# Patient Record
Sex: FEMALE | Race: WHITE | NOT HISPANIC OR LATINO | Employment: UNEMPLOYED | URBAN - METROPOLITAN AREA
[De-identification: names, ages, dates, MRNs, and addresses within clinical notes are randomized per-mention and may not be internally consistent; named-entity substitution may affect disease eponyms.]

---

## 2017-03-20 ENCOUNTER — OFFICE VISIT (OUTPATIENT)
Dept: BEHAVIORAL HEALTH | Facility: PHYSICIAN GROUP | Age: 22
End: 2017-03-20
Payer: COMMERCIAL

## 2017-03-20 DIAGNOSIS — F33.0 MILD EPISODE OF RECURRENT MAJOR DEPRESSIVE DISORDER (HCC): ICD-10-CM

## 2017-03-20 PROCEDURE — 99214 OFFICE O/P EST MOD 30 MIN: CPT | Performed by: PSYCHIATRY & NEUROLOGY

## 2017-03-20 RX ORDER — VENLAFAXINE HYDROCHLORIDE 37.5 MG/1
37.5 CAPSULE, EXTENDED RELEASE ORAL
Qty: 7 CAP | Refills: 0 | Status: SHIPPED | OUTPATIENT
Start: 2017-03-20 | End: 2021-10-08

## 2017-03-20 RX ORDER — VENLAFAXINE HYDROCHLORIDE 75 MG/1
75 TABLET, EXTENDED RELEASE ORAL DAILY
Qty: 30 TAB | Refills: 2 | Status: SHIPPED | OUTPATIENT
Start: 2017-03-20 | End: 2021-10-08

## 2017-03-20 NOTE — PROGRESS NOTES
"[facility]  PSYCHIATRIC FOLLOW-UP NOTE    Name: Rosa M Easton  MRN: [id number]  : 1995  Age: 22 y.o.  Date of assessment: 3/20/2017  PCP: Lesa Caraballo M.D.  Persons in attendance: Patient    REASON FOR VISIT/CHIEF COMPLAINT (as stated by Patient):  Rosa M Easton is a 22 y.o., White female, attending follow-up appointment for   Chief Complaint   Patient presents with   • Follow-Up     feeling more depressed.     .      HISTORY OF PRESENT ILLNESS:    Pt was being tx for MDD in the past.  Last seen in clinic on 2015 by Dr. Pritchett.  Pt had weaned herself off the meds.  Pt reports that she had been doing well for a while.  She has been living with her Dad and little sister.  Pt states she was doing well for a while and that was the driving reason she stopped the meds.  Pt was on Effexor and Abilify.  Pt also endorsed a paranoia around the night robert, but this stopped when she ceased the two agents.     In the last 3 months, pt has felt her mood begin to deteriorate, but she finally decided to come in for appt last week.  Pt denies any triggers or social changes that may have contributed to the change in mood.  Pt noticed she is sleeping more and napping more (about 9-10hr) where she typically is a 6-7hr person.  Pt also endorsed + anhedonia, low energy, ok-low concentration, increased appetite (with weight gain recent 20lbs), +sluggish feeling, DENIES any recent SI.  Pt had a SA in 3/2105 just after the parents divorce and her mother choose the \"new BF\" over the family.  Pt feels the Effexor was helpful in the past.       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  Participation: Active verbal participation  Grooming:Neat  Orientation: Alert and Fully Oriented  Eye contact: Good  Behavior:Calm   Mood: Euthymic \"nicol\"  Affect: Flat and Congruent with content  Thought process: Logical and Goal-directed  Thought content:  Within normal limits  Speech: Rate within normal limits and Volume within normal limits  Perception:  Within normal limits  Memory:  No gross evidence of memory deficits  Insight: Good  Judgment: Good  Family/couple interaction observations:   Other:    Current risk:    Suicide: Low   Homicide: Low   Self-harm: Low  Relapse: Low  Other:   Crisis Safety Plan reviewed?Yes  If evidence of imminent risk is present, intervention/plan:    Medical Records/Labs/Diagnostic Tests Reviewed: no recent labs    Medical Records/Labs/Diagnostic Tests Ordered: order routine labs    DIAGNOSTIC IMPRESSION(S):  1. Mild episode of recurrent major depressive disorder (CMS-HCC)           ASSESSMENT AND PLAN:  #MDD, rec, Mild  Pt was previously being tx for depression in 2015, but she weaned herself off meds and was lost to f/u.  For about the last 3 months, pt has felt her mood deteriorate, and she decided to come in.  Pt denies any known triggers or changes that may have contributed.  She states things have been going well with living with her father, and she and the BF are getting along.  Pt is now working as well.  Pt screened positive for depression criteria.  Pt feels she did well on the Effexor in the past, so will resume.    Restart Effexor XR, taper up to 75mg PO Daily.  Routine labs.  Discussed increase exercise and diet changes    RTC 4wks      More than 50% of face-to-face time in this 30 minute visit was not spent in " psychotherapy/counseling.    Topics addressed include:    Hamlet Charles M.D.

## 2017-03-20 NOTE — MR AVS SNAPSHOT
Rosa M Easton   3/20/2017 1:00 PM   Office Visit   MRN: [id number]    Department:  [facility]   Dept Phone:  [phone]    Description:  Female : 1995   Provider:  Hamlet Charles M.D.           Reason for Visit     Follow-Up feeling more depressed.        Allergies as of 3/20/2017     No Known Allergies      You were diagnosed with     Mild episode of recurrent major depressive disorder (CMS-[facility])   [[id number]]         Vital Signs     Smoking Status                   Never Smoker            Basic Information     Date Of Birth Sex Race Ethnicity Preferred Language    1995 Female White Non- English      Problem List              ICD-10-CM Priority Class Noted - Resolved    Overdose T50.901A   3/27/2015 - Present      Health Maintenance        Date Due Completion Dates    IMM HEP B VACCINE (1 of 3 - Primary Series) 1997 ---    IMM HEP A VACCINE (1 of 2 - Standard Series) 1998 ---    IMM HPV VACCINE (1 of 3 - Female 3 Dose Series) 2008 ---    IMM VARICELLA (CHICKENPOX) VACCINE (1 of 2 - 2 Dose Adolescent Series) 2010 ---    IMM MENINGOCOCCAL VACCINE (MCV4) (1 of 1) 2013 ---    IMM DTaP/Tdap/Td Vaccine (1 - Tdap) 2016 ---    IMM INFLUENZA (1) 2016 ---            Current Immunizations     No immunizations on file.      Below and/or attached are the medications your provider expects you to take. Review all of your home medications and newly ordered medications with your provider and/or pharmacist. Follow medication instructions as directed by your provider and/or pharmacist. Please keep your medication list with you and share with your provider. Update the information when medications are discontinued, doses are changed, or new medications (including over-the-counter products) are added; and carry medication information at all times in the event of emergency situations     Allergies:  No Known Allergies          Medications  Valid as of: 2017 -   1:54 PM    Generic Name Brand Name Tablet Size Instructions for use    Acetaminophen-Codeine (Tab) Acetaminophen-Codeine 300-30 MG Take 2 Tabs by mouth every four hours as needed.        Albuterol Sulfate (Aero Soln) albuterol 108 (90 BASE) MCG/ACT Inhale 2 Puffs by mouth every 6 hours as needed for Shortness of Breath.        Amoxicillin-Pot Clavulanate (Tab) AUGMENTIN 875-125 MG Take 1 Tab by mouth 2 times a day.        ARIPiprazole   Take  by mouth.        Azithromycin (Tab) ZITHROMAX 250 MG Use HOLLY as directed        Cetirizine HCl (Tab) ZYRTEC 10 MG Take 10 mg by mouth every day.        DiphenhydrAMINE HCl   Take  by mouth.        Fexofenadine HCl (Tab) ALLEGRA 60 MG Take 60 mg by mouth every day.        Fluconazole (Tab) DIFLUCAN 150 MG 1 tablet on day one, then 1 tablet PO on day 3 if no improvment        MethylPREDNISolone (Tab) MEDROL DOSPACK 4 MG U.D.        PredniSONE (Tab) DELTASONE 20 MG 2 tablets PO x 3 days.  Take with food in the morning.        Pseudoephedrine-APAP-DM   Take  by mouth.        Saline (Solution) OCEAN 0.65 % Spray 1 Spray in nose as needed for Congestion.        Venlafaxine HCl (CAPSULE SR 24 HR) EFFEXOR XR 37.5 MG Take 1 Cap by mouth every morning with breakfast.        Venlafaxine HCl (TABLET SR 24 HR) Venlafaxine HCl 75 MG Take 1 Tab by mouth every day.        .                 Medicines prescribed today were sent to:     [facility] [address] [place] [address] [address] [facility] [address]    Phone: [phone] Fax: [phone]    Open 24 Hours?: Yes      Medication refill instructions:       If your prescription bottle indicates you have medication refills left, it is not necessary to call your provider’s office. Please contact your pharmacy and they will refill your medication.    If your prescription bottle indicates you do not have any refills left, you may request refills at any time through one of the following ways:  The online [employer] system (except Urgent Care), by calling your provider’s office, or by asking your pharmacy to contact your provider’s office with a refill request. Medication refills are processed only during regular business hours and may not be available until the next business day. Your provider may request additional information or to have a follow-up visit with you prior to refilling your medication.   *Please Note: Medication refills are assigned a new Rx number when refilled electronically. Your pharmacy may indicate that no refills were authorized even though a new prescription for the same medication is available at the pharmacy. Please request the medicine by name with the pharmacy before contacting your provider for a refill.        Your To Do List     Future Labs/Procedures Complete By Expires    CBC WITH DIFFERENTIAL  As directed 3/20/2018    COMP METABOLIC PANEL  As directed 3/20/2018    HEMOGLOBIN A1C  As directed 3/20/2018    LIPID PROFILE  As directed 3/20/2018         [employer] Access Code: [email or web address]  Expires: 4/19/2017  1:04 PM    [employer]  A secure, online tool to manage your health information     [employer]’s [employer]® is a secure, online tool that connects you to your personalized health information from the privacy of your home -- day or night - making it very easy for you to manage your healthcare. Once the activation process is completed, you can even access your medical information using the [employer] myron, which is available for free in the Apple Myron store or Google Play store.     [employer] provides the following levels of access (as shown below):   My Chart Features   [facility] Doctor [facility]  Specialists [facility]  Urgent  Care [facility]  Primary Care  Doctor   Email your healthcare team securely and privately 24/7 X X X    Manage appointments: schedule your next appointment; view details of past/upcoming appointments X      Request prescription refills. X      View recent  personal medical records, including lab and immunizations X X X X   View health record, including health history, allergies, medications X X X X   Read reports about your outpatient visits, procedures, consult and ER notes X X X X   See your discharge summary, which is a recap of your hospital and/or ER visit that includes your diagnosis, lab results, and care plan. X X       How to register for "[employer]":  1. Go to  [email or web address].  2. Click on the Sign Up Now box, which takes you to the New Member Sign Up page. You will need to provide the following information:  a. Enter your "[employer]" Access Code exactly as it appears at the top of this page. (You will not need to use this code after you’ve completed the sign-up process. If you do not sign up before the expiration date, you must request a new code.)   b. Enter your date of birth.   c. Enter your home email address.   d. Click Submit, and follow the next screen’s instructions.  3. Create a "[employer]" ID. This will be your "[employer]" login ID and cannot be changed, so think of one that is secure and easy to remember.  4. Create a "[employer]" password. You can change your password at any time.  5. Enter your Password Reset Question and Answer. This can be used at a later time if you forget your password.   6. Enter your e-mail address. This allows you to receive e-mail notifications when new information is available in "[employer]".  7. Click Sign Up. You can now view your health information.    For assistance activating your "[employer]" account, call [phone]

## 2017-09-12 ENCOUNTER — OCCUPATIONAL MEDICINE (OUTPATIENT)
Dept: URGENT CARE | Facility: CLINIC | Age: 22
End: 2017-09-12
Payer: COMMERCIAL

## 2017-09-12 VITALS
RESPIRATION RATE: 16 BRPM | TEMPERATURE: 99.1 F | HEIGHT: 66 IN | DIASTOLIC BLOOD PRESSURE: 62 MMHG | WEIGHT: 176 LBS | OXYGEN SATURATION: 100 % | SYSTOLIC BLOOD PRESSURE: 124 MMHG | BODY MASS INDEX: 28.28 KG/M2 | HEART RATE: 73 BPM

## 2017-09-12 DIAGNOSIS — Z77.29 DUST EXPOSURE: ICD-10-CM

## 2017-09-12 DIAGNOSIS — H10.13 ALLERGIC CONJUNCTIVITIS, BILATERAL: Primary | ICD-10-CM

## 2017-09-12 DIAGNOSIS — R21 RASH OF FACE: ICD-10-CM

## 2017-09-12 PROCEDURE — 99213 OFFICE O/P EST LOW 20 MIN: CPT | Performed by: PHYSICIAN ASSISTANT

## 2017-09-12 NOTE — LETTER
"   [facility]   [address] [facility] [address] [place] [address]  Phone: [phone] - Fax: [phone]        [facility] Progress Report and Disability Certification  Date of Service: 9/12/2017   No Show:  No  Date / Time of Next Visit: 9/15/2017 @ 4;20PM   Claim Information   Patient Name: Rosa M Easton  Claim Number:     Employer:  [employer] Date of Injury: 9/12/2017     Insurer / TPA: [employer]  ID / SSN:     Occupation:    Diagnosis: The primary encounter diagnosis was Allergic conjunctivitis, bilateral. Diagnoses of Rash of face and Dust exposure were also pertinent to this visit.    Medical Information   Related to Industrial Injury? Yes  Comments:chronic dust exposure    Subjective Complaints:  DOI: 9/12/17  Pt notes excessive dust coating her work environment for months and now notes irritant rash on cheeks and itchy red eyes x 7-10 days. She states these symptoms improve once away from work but notes they return the moment she returns to work. She states multiple employees are having health issues with the dust exposure and states that the location was closed down and inspected and then reopened due to this work/dust contamination/exposure issue. She states this was first a problem back in february when the clinic was closed due to environmental/ and safety concerns (clinic on [address]). They brought her and the staff back to work, months later, after they told them the issue was fixed . She states since \"we've been back, about half of the employees including myself are having the same if not worse symptoms\". Pt has not taken any Rx medications for this condition. Pt states the pain is a 4/10 from itching, aching in nature and worse during the day while at work. Pt denies CP, SOB, NVD, paresthesias, headaches, dizziness, change in vision,  or other joint pain. The pt's medication list, problem list, and allergies have " been evaluated and reviewed during today's visit.      Objective Findings: Face: noted B/L cheek erythema in nature of contact dermatitis  Eyes: EOM are normal. Conjunctiva on left and right are injected. Pupils are equal, round, and reactive to light. Right eye exhibits no discharge. Left eye exhibits no discharge. No scleral icterus.      Pre-Existing Condition(s):     Assessment:   Initial Visit    Status: Additional Care Required  Permanent Disability:No    Plan:      Diagnostics:   Comments:n/a    Comments:       Disability Information   Status: Released to Full Duty    From:  9/12/2017  Through: 9/15/2017 Restrictions are:     Physical Restrictions   Sitting:    Standing:    Stooping:    Bending:      Squatting:    Walking:    Climbing:    Pushing:      Pulling:    Other:    Reaching Above Shoulder (L):   Reaching Above Shoulder (R):       Reaching Below Shoulder (L):    Reaching Below Shoulder (R):      Not to exceed Weight Limits   Carrying(hrs):   Weight Limit(lb):   Lifting(hrs):   Weight  Limit(lb):     Comments: Pt towear a mask and protective clothing while at work and to take breaks to face her face often to clean away the irritant.    Repetitive Actions   Hands: i.e. Fine Manipulations from Grasping:     Feet: i.e. Operating Foot Controls:     Driving / Operate Machinery:     Physician Name: Dionne Pires P.A.-C. Physician Signature: DIONNE Do P.A.-C. e-Signature: Dr. Gilson Rausch, Medical Director   Clinic Name / Location: [address] [address] Clinic Phone Number: Dept: [phone]   Appointment Time: 4:15 Pm Visit Start Time: 4:56 PM   Check-In Time:  4:44 Pm Visit Discharge Time:  5:25PM   Original-Treating Physician or Chiropractor    Page 2-Insurer/TPA    Page 3-Employer    Page 4-Employee

## 2017-09-12 NOTE — LETTER
"EMPLOYEE’S CLAIM FOR COMPENSATION/ REPORT OF INITIAL TREATMENT  FORM C-4    EMPLOYEE’S CLAIM - PROVIDE ALL INFORMATION REQUESTED   First Name  Rosa M Last Name  Jemma Birthdate                    1995                Sex  female Claim Number   Home Address  [address] Age  22 y.o. Height  1.676 m (5' 6\") Weight  79.8 kg (176 lb) [facility]     [place]  [address] Telephone  [phone] (home)    Mailing Address  [address] [place] Zip  [address] Primary Language Spoken  English    Insurer  unknown Third Party   Misc Workers Comp   Employee's Occupation (Job Title) When Injury or Occupational Disease Occurred       Employer's Name    [employer] Telephone  [phone]    Employer Address  [address]  Zip  [address]    Date of Injury  9/12/2017               Hour of Injury  11:00 AM Date Employer Notified  9/12/2017 Last Day of Work after Injury or Occupational Disease  9/12/2017 Supervisor to Whom Injury Reported  Pascual Ngo / Ilya Vasquez    Address or Location of Accident (if applicable)  [[facility] [facility] [id number] ]   What were you doing at the time of accident? (if applicable)  Working - Emptying trash    How did this injury or occupational disease occur? (Be specific an answer in detail. Use additional sheet if necessary)  This was first a problem back in february when the clinic was closed due to environmental/ and safety concerns (clinic on [facility]). They brought us back months later after they told us it was fixed . Since we've been back, about half of the employees including myself are aving the same if not worse symptoms.    If you believe that you have an occupational disease, when did you first have knowledge of the disability and it relationship to your employment?  NA Witnesses to the " Accident  Barbie, yu, johnie, rhoda, omar      Nature of Injury or Occupational Disease  No Physical Injury  Part(s) of Body Injured or Affected  Nose, Soft Tissue - Neck, Facial Bones    I certify that the above is true and correct to the best of my knowledge and that I have provided this information in order to obtain the benefits of [place]  I hereby authorize any physician, chiropractor, surgeon, practitioner, or other person, any hospital, including [facility], any medical service organization, any insurance company, or other institution or organization to release to each other, any medical or other information, including benefits paid or payable, pertinent to this injury or disease, except information relative to diagnosis, treatment and/or counseling for AIDS, psychological conditions, alcohol or controlled substances, for which I must give specific authorization.  A Photostat of this authorization shall be as valid as the original.     Date   Place   Employee’s Signature   THIS REPORT MUST BE COMPLETED AND MAILED WITHIN 3 WORKING DAYS OF TREATMENT   Place  [facility]  Name of [facility]   Date  9/12/2017 Diagnosis  (H10.13) Allergic conjunctivitis, bilateral  (primary encounter diagnosis)  (R21) Rash of face  (Z77.29) Dust exposure Is there evidence the injured employee was under the influence of alcohol and/or another controlled substance at the time of accident?   Hour  4:56 PM Description of Injury or Disease  The primary encounter diagnosis was Allergic conjunctivitis, bilateral. Diagnoses of Rash of face and Dust exposure were also pertinent to this visit. No   Treatment  Rest, wear a mask and protective clothing while working. OTC benadryl and Zaditor prior to working.  Have you advised the patient to remain off work five days or more? No    "  X-Ray Findings    Comments:n/a   If Yes   From Date  To Date      From information given by the employee, together with medical evidence, can you directly connect this injury or occupational disease as job incurred?  Yes  Comments:Pt to follow up with [facility]: Pt states she only has symptoms while at work due to the excessive dust If No Full Duty  Yes Modified Duty      Is additional medical care by a physician indicated?  Yes If Modified Duty, Specify any Limitations / Restrictions      Do you know of any previous injury or disease contributing to this condition or occupational disease?                            No   Date  9/12/2017 Print Doctor’s Name Dionne Pires P.A.-C. I certify the employer’s copy of  this form was mailed on:   Address  [address]     [place] Zip  [address]    Provider’s Tax ID Number  [id number]  Telephone  Dept: [phone]        e-DIONNE Nickerson P.A.-C.   e-Signature: Dr. Gilson Rausch, Medical Director Degree  P[employer]        ORIGINAL-TREATING PHYSICIAN OR CHIROPRACTOR    PAGE 2-INSURER/TPA    PAGE 3-EMPLOYER    PAGE 4-EMPLOYEE             Form C-4 (rev10/07)              BRIEF DESCRIPTION OF RIGHTS AND BENEFITS  (Pursuant to [place] 616C.050)    Notice of Injury or Occupational Disease (Incident Report Form C-1): If an injury or occupational disease (OD) arises out of and in the  course of employment, you must provide written notice to your employer as soon as practicable, but no later than 7 days after the accident or  OD. Your employer shall maintain a sufficient supply of the required forms.    Claim for Compensation (Form C-4): If medical treatment is sought, the form C-4 is available at the place of initial treatment. A completed  \"Claim for Compensation\" (Form C-4) must be filed within 90 days after an accident or OD. The treating physician or chiropractor must,  within 3 working days after treatment, complete and mail to " the employer, the employer's insurer and third-party , the Claim for  Compensation.    Medical Treatment: If you require medical treatment for your on-the-job injury or OD, you may be required to select a physician or  chiropractor from a list provided by your workers’ compensation insurer, if it has contracted with an Organization for Managed Care (MCO) or  Preferred Provider Organization (PPO) or providers of health care. If your employer has not entered into a contract with an MCO or PPO, you  may select a physician or chiropractor from the Panel of Physicians and Chiropractors. Any medical costs related to your industrial injury or  OD will be paid by your insurer.    Temporary Total Disability (TTD): If your doctor has certified that you are unable to work for a period of at least 5 consecutive days, or 5  cumulative days in a 20-day period, or places restrictions on you that your employer does not accommodate, you may be entitled to TTD  compensation.    Temporary Partial Disability (TPD): If the wage you receive upon reemployment is less than the compensation for TTD to which you are  entitled, the insurer may be required to pay you TPD compensation to make up the difference. TPD can only be paid for a maximum of 24  months.    Permanent Partial Disability (PPD): When your medical condition is stable and there is an indication of a PPD as a result of your injury or  OD, within 30 days, your insurer must arrange for an evaluation by a rating physician or chiropractor to determine the degree of your PPD. The  amount of your PPD award depends on the date of injury, the results of the PPD evaluation and your age and wage.    Permanent Total Disability (PTD): If you are medically certified by a treating physician or chiropractor as permanently and totally disabled  and have been granted a PTD status by your insurer, you are entitled to receive monthly benefits not to exceed 66 2/3% of your  average  monthly wage. The amount of your PTD payments is subject to reduction if you previously received a PPD award.    Vocational Rehabilitation Services: You may be eligible for vocational rehabilitation services if you are unable to return to the job due to a  permanent physical impairment or permanent restrictions as a result of your injury or occupational disease.    Transportation and Per Óscar Reimbursement: You may be eligible for travel expenses and per óscar associated with medical treatment.    Reopening: You may be able to reopen your claim if your condition worsens after claim closure.    Appeal Process: If you disagree with a written determination issued by the insurer or the insurer does not respond to your request, you may  appeal to the Department of Administration, , by following the instructions contained in your determination letter. You must  appeal the determination within 70 days from the date of the determination letter at [address], [address], [place], [place]  [address], or [address], [address], [place] [address]. If you disagree with the  decision, you may appeal to the  Department of Administration, . You must file your appeal within 30 days from the date of the  decision  letter at [address], [address], [place], [place] [address], or [address], [address], [place] [address]. If you  disagree with a decision of an , you may file a petition for judicial review with the District Court. You must do so within 30  days of the Appeal Officer’s decision. You may be represented by an  at your own expense or you may contact the [facility] for possible  representation.    [place]  for Injured Workers (NAIW): If you disagree with a  decision, you may request that NAIW represent you  without charge at an  Hearing. For information  regarding denial of benefits, you may contact the [facility] at: [address], [address], [place] [address], [phone], or [address], [place] [address], [phone]    To File a Complaint with the Division: If you wish to file a complaint with the  of the Division of Industrial Relations (DIR),  please contact the Workers’ Compensation Section, [address], [address], [place], [place] [address], telephone [phone], or  [address], [address], [place], [place] [address], telephone [phone].    For assistance with Workers’ Compensation Issues: you may contact the Office of the Governor Consumer Health Assistance, [address] [place] [address], [address], [place], [place] [address], Toll Free 1-292.325.6529, Web site: http://govcha.[facility].nv.us, E-mail  [email or web address].                                                                                                                                                                                                                                   __________________________________________________________________                                                                   _________________                Employee Name / Signature                                                                                                                                                       Date                                                                                                                                                                                                     D-2 (rev. 10/07)

## 2017-09-13 NOTE — PROGRESS NOTES
"Subjective:      Pt is a 22 y.o. female who presents with Itchy (x 1 day/ red dust at work)      DOI: 9/12/17  Pt notes excessive dust coating her work environment for months and now notes irritant rash on cheeks and itchy red eyes x 7-10 days. She states these symptoms improve once away from work but notes they return the moment she returns to work. She states multiple employees are having health issues with the dust exposure and states that the location was closed down and inspected and then reopened due to this work/dust contamination/exposure issue. She states this was first a problem back in february when the clinic was closed due to environmental/ and safety concerns (clinic on [facility]). They brought her and the staff back to work, months later, after they told them the issue was fixed . She states since \"we've been back, about half of the employees including myself are having the same if not worse symptoms\". Pt has not taken any Rx medications for this condition. Pt states the pain is a 4/10 from itching, aching in nature and worse during the day while at work. Pt denies CP, SOB, NVD, paresthesias, headaches, dizziness, change in vision,  or other joint pain. The pt's medication list, problem list, and allergies have been evaluated and reviewed during today's visit.      Pt denies second job.      HPI  PMH:  Past Medical History:   Diagnosis Date   • Allergy, unspecified not elsewhere classified    • Depression        PSH:  Past Surgical History:   Procedure Laterality Date   • MENISCUS REPAIR  5/2014    Right       Fam Hx:  the patient's family history is not pertinent to their current complaint      Soc HX:  Social History     Social History   • Marital status: Single     Spouse name: N/A   • Number of children: N/A   • Years of education: N/A     Occupational History   • Not on file.     Social History Main Topics   • Smoking status: Never Smoker   • Smokeless tobacco: Not on file   • Alcohol use No "   • Drug use: No      Comment: denies   • Sexual activity: Not on file     Other Topics Concern   • Not on file     Social History Narrative   • No narrative on file         Medications:    Current Outpatient Prescriptions:   •  venlafaxine XR (EFFEXOR XR) 37.5 MG CAPSULE SR 24 HR, Take 1 Cap by mouth every morning with breakfast., Disp: 7 Cap, Rfl: 0  •  Venlafaxine HCl 75 MG TABLET SR 24 HR, Take 1 Tab by mouth every day., Disp: 30 Tab, Rfl: 2  •  Pseudoephedrine-APAP-DM (DAYQUIL PO), Take  by mouth., Disp: , Rfl:   •  sodium chloride (OCEAN) 0.65 % Solution, Spray 1 Spray in nose as needed for Congestion., Disp: , Rfl:   •  fluconazole (DIFLUCAN) 150 MG tablet, 1 tablet on day one, then 1 tablet PO on day 3 if no improvment, Disp: 2 Tab, Rfl: 2  •  predniSONE (DELTASONE) 20 MG Tab, 2 tablets PO x 3 days.  Take with food in the morning., Disp: 6 Tab, Rfl: 0  •  amoxicillin-clavulanate (AUGMENTIN) 875-125 MG Tab, Take 1 Tab by mouth 2 times a day., Disp: 20 Tab, Rfl: 0  •  Acetaminophen-Codeine 300-30 MG Tab, Take 2 Tabs by mouth every four hours as needed., Disp: 20 Tab, Rfl: 0  •  methylPREDNISolone (MEDROL DOSPACK) 4 MG TABS, U.D., Disp: 1 Tab, Rfl: 0  •  azithromycin (ZITHROMAX) 250 MG TABS, Use HOLLY as directed, Disp: 6 Tab, Rfl: 0  •  cetirizine (ZYRTEC) 10 MG TABS, Take 10 mg by mouth every day., Disp: , Rfl:   •  fexofenadine (ALLEGRA) 60 MG TABS, Take 60 mg by mouth every day., Disp: , Rfl:   •  ARIPiprazole (ABILIFY PO), Take  by mouth., Disp: , Rfl:   •  DiphenhydrAMINE HCl (BENADRYL ALLERGY PO), Take  by mouth., Disp: , Rfl:   •  albuterol (VENTOLIN OR PROVENTIL) 108 (90 BASE) MCG/ACT AERS inhalation aerosol, Inhale 2 Puffs by mouth every 6 hours as needed for Shortness of Breath., Disp: 1 Inhaler, Rfl: 1      Allergies:  Review of patient's allergies indicates no known allergies.    ROS  Constitutional: Negative for fever, chills and malaise/fatigue.   HENT: Negative for congestion and sore throat.   "  Eyes: Negative for blurred vision, double vision and photophobia. +watery and itchy eyes B/L  Respiratory: Negative for cough and shortness of breath.    Cardiovascular: Negative for chest pain and palpitations.   Gastrointestinal: Negative for heartburn, nausea, vomiting, abdominal pain, diarrhea and constipation.   Genitourinary: Negative for dysuria and flank pain.   Musculoskeletal: Negative for joint pain and myalgias.   Skin: POS for itching and rash of cheeks B/L.   Neurological: Negative for dizziness, tingling and headaches.   Endo/Heme/Allergies: Does not bruise/bleed easily.   Psychiatric/Behavioral: Negative for depression. The patient is not nervous/anxious.           Objective:     /62   Pulse 73   Temp 37.3 °C (99.1 °F)   Resp 16   Ht 1.676 m (5' 6\")   Wt 79.8 kg (176 lb)   LMP 08/15/2017   SpO2 100%   Breastfeeding? No   BMI 28.41 kg/m²      Physical Exam    Face: noted B/L cheek erythema in nature of contact dermatitis  Eyes: EOM are normal. Conjunctiva on left and right are injected. Pupils are equal, round, and reactive to light. Right eye exhibits no discharge. Left eye exhibits no discharge. No scleral icterus.     Constitutional: PT is oriented to person, place, and time. PT appears well-developed and well-nourished. No distress.   HENT:   Head: Normocephalic and atraumatic.   Mouth/Throat: Oropharynx is clear and moist. No oropharyngeal exudate.   Neck: Normal range of motion. Neck supple. No thyromegaly present.   Cardiovascular: Normal rate, regular rhythm, normal heart sounds and intact distal pulses.  Exam reveals no gallop and no friction rub.    No murmur heard.  Pulmonary/Chest: Effort normal and breath sounds normal. No respiratory distress. PT has no wheezes. PT has no rales. Pt exhibits no tenderness.   Abdominal: Soft. Bowel sounds are normal. PT exhibits no distension and no mass. There is no tenderness. There is no rebound and no guarding.   Musculoskeletal: " Normal range of motion. PT exhibits no edema and no tenderness.   Neurological: PT is alert and oriented to person, place, and time. PT has normal reflexes. No cranial nerve deficit.        Psychiatric: PT has a normal mood and affect. PT behavior is normal. Judgment and thought content normal.        Assessment/Plan:     1. Allergic conjunctivitis, bilateral      2. Rash of face      3. Dust exposure    Pt notes this issue has made the local news concerning the unknown dust covering her work coming through the work vent shafts.  Zaditor and Benadryl OTC recommended  Rest, fluids encouraged.  AVS with medical info given.  Pt was in full understanding and agreement with the plan.  Pt to follow up with [facility] in 3 days as this case seems a bit more complex than the standard urgent care Work comp cases

## 2017-09-13 NOTE — PATIENT INSTRUCTIONS
Organic Dust Toxicity Syndrome  Organic dust toxicity syndrome, also called grain fever, is an illness. It causes fatigue, fever, chills, and muscle aches and pains. It often occurs in people who work in grain elevators and those with heavy exposure to organic dusts. Organic dusts are tiny dust particles that come from hay, grain, straw, and livestock animals.   The condition is not an infection. It is the reaction of your body's defense system (immune system) to dust and to bacteria or fungi that are growing in the dust. Organic dust toxicity syndrome cannot be passed from person to person.  CAUSES   The condition is caused by breathing in fungi, bacterial toxins, or contaminants in organic dust.  RISK FACTORS  You may be at risk if you:   · Are a farmer or agricultural worker who is exposed to organic dusts. Your risk increases if the dust is old or moldy.  · Work in an area that is not well ventilated.  · Do not wear breathing protection.  SIGNS AND SYMPTOMS   Symptoms usually start within 12 hours of breathing in organic dust. They may include:  · Tightness in your chest.  · Chills.  · Dry cough.  · Muscle aches.  · Fever.  · Headache.  · Fatigue.   DIAGNOSIS   Your health care provider can usually diagnose organic dust toxicity syndrome based on your symptoms and a history of recent exposure to organic dusts. Your health care provider may also do a physical exam and blood test.   TREATMENT   The condition usually goes away after a few days. The goal of treatment is to relieve your symptoms. This may include resting at home and taking over-the-counter medicines for aches and fever.  HOME CARE INSTRUCTIONS   · Rest as directed by your health care provider.  · Take medicines only as directed by your health care provider.  SEEK MEDICAL CARE IF:   · Your symptoms do not get better or go away in a few days.  · Your chest feels tighter.  · Your muscle aches are increasing.  · You have  worsening:  ¨ Chills.  ¨ Cough.  ¨ Fever.  ¨ Headache.  ¨ Fatigue.  MAKE SURE YOU:   · Understand these instructions.  · Will watch your condition.  · Will get help right away if you are not doing well or get worse.     This information is not intended to replace advice given to you by your health care provider. Make sure you discuss any questions you have with your health care provider.     Document Released: 11/23/2005 Document Revised: 01/08/2016 Document Reviewed: 04/22/2015  Elsevier Interactive Patient Education ©2016 [employer]

## 2017-09-14 ENCOUNTER — OCCUPATIONAL MEDICINE (OUTPATIENT)
Dept: URGENT CARE | Facility: CLINIC | Age: 22
End: 2017-09-14
Payer: COMMERCIAL

## 2017-09-14 VITALS
DIASTOLIC BLOOD PRESSURE: 72 MMHG | HEIGHT: 66 IN | BODY MASS INDEX: 27.64 KG/M2 | RESPIRATION RATE: 14 BRPM | TEMPERATURE: 97.8 F | HEART RATE: 64 BPM | OXYGEN SATURATION: 97 % | SYSTOLIC BLOOD PRESSURE: 122 MMHG | WEIGHT: 172 LBS

## 2017-09-14 DIAGNOSIS — L24.9 IRRITANT CONTACT DERMATITIS, UNSPECIFIED TRIGGER: ICD-10-CM

## 2017-09-14 PROCEDURE — 99213 OFFICE O/P EST LOW 20 MIN: CPT | Performed by: NURSE PRACTITIONER

## 2017-09-14 RX ORDER — KETOTIFEN FUMARATE 0.25 MG/ML
1 SOLUTION/ DROPS OPHTHALMIC 2 TIMES DAILY
COMMUNITY
End: 2021-10-08

## 2017-09-14 ASSESSMENT — ENCOUNTER SYMPTOMS
CHILLS: 0
SHORTNESS OF BREATH: 0
COUGH: 0
FEVER: 0

## 2017-09-14 NOTE — LETTER
"EMPLOYEE’S CLAIM FOR COMPENSATION/ REPORT OF INITIAL TREATMENT  FORM C-4    EMPLOYEE’S CLAIM - PROVIDE ALL INFORMATION REQUESTED   First Name  Rosa M Last Name  Jemma Birthdate                    1995                Sex  female Claim Number   Home Address  [address] Age  22 y.o. Height  1.676 m (5' 6\") Weight  78 kg (172 lb) N     [place]  [address] Telephone  [phone] (home)    Mailing Address  [address] [place]  [address] Primary Language Spoken  English    Insurer   Third Party   Misc Workers Comp   Employee's Occupation (Job Title) When Injury or Occupational Disease Occurred      Employer's Name    [employer]  Telephone      Employer Address   [address]  Zip   [address]   Date of Injury  9/14/2017               Hour of Injury  11:00 AM Date Employer Notified  9/14/2017 Last Day of Work after Injury or Occupational Disease  9/14/2017 Supervisor to Whom Injury Reported  Ilya Vasquez   Address or Location of Accident (if applicable)  [[facility] [id number]   What were you doing at the time of accident? (if applicable)  Cleaning    How did this injury or occupational disease occur? (Be specific an answer in detail. Use additional sheet if necessary)  While cleaning I noticed my back was itchy and felt like it burned a little so I went ot the bathroom to check it out and noticed it was super red and I had a whole bunch of little bumps. This isn't my first reaction. This is my second time in urgent care for this type of rash on my body.    If you believe that you have an occupational disease, when did you first have knowledge of the disability and it relationship to your employment?  N/A Witnesses to the Accident  Domitila, Helena, Adry, Pilar      Nature of Injury or Occupational " Disease  Infection  Part(s) of Body Injured or Affected  Lower Back Area (Lumbar Area & Lumbo-Sacral), N/A, N/A    I certify that the above is true and correct to the best of my knowledge and that I have provided this information in order to obtain the benefits of [place]  I hereby authorize any physician, chiropractor, surgeon, practitioner, or other person, any hospital, including [facility] or [facility], any medical service organization, any insurance company, or other institution or organization to release to each other, any medical or other information, including benefits paid or payable, pertinent to this injury or disease, except information relative to diagnosis, treatment and/or counseling for AIDS, psychological conditions, alcohol or controlled substances, for which I must give specific authorization.  A Photostat of this authorization shall be as valid as the original.     Date   Place   Employee’s Signature   THIS REPORT MUST BE COMPLETED AND MAILED WITHIN 3 WORKING DAYS OF TREATMENT   Place  [facility]  Name of [facility]   Date  9/14/2017 Diagnosis  (L24.9) Irritant contact dermatitis, unspecified trigger Is there evidence the injured employee was under the influence of alcohol and/or another controlled substance at the time of accident?   Hour  2:00 PM Description of Injury or Disease  The encounter diagnosis was Irritant contact dermatitis, unspecified trigger. No   Treatment  Continue benadryl, refused steroid, return tomorrow for occupational health visit.  Have you advised the patient to remain off work five days or more? No   X-Ray Findings    Comments:n/a   If Yes   From Date  To Date      From information given by the employee, together with medical evidence, can you directly connect this injury or occupational disease as job  "incurred?    Comments:unknown If No Full Duty  Yes Modified Duty      Is additional medical care by a physician indicated?  Yes If Modified Duty, Specify any Limitations / Restrictions      Do you know of any previous injury or disease contributing to this condition or occupational disease?                            No   Date  9/14/2017 Print Doctor’s Name Cathey J Hamman, A.P.N. I certify the employer’s copy of  this form was mailed on:   Address  [address] Insurer’s Use Only     [place] Zip  [address]    Provider’s Tax ID Number  [id number]  Telephone  Dept: [phone]        anthony-SignHAMMAN, CATHEY J A.P.N.   e-Signature: Dr. Gilson Rausch, Medical Director Degree  APN        ORIGINAL-TREATING PHYSICIAN OR CHIROPRACTOR    PAGE 2-INSURER/TPA    PAGE 3-EMPLOYER    PAGE 4-EMPLOYEE             Form C-4 (rev10/07)              BRIEF DESCRIPTION OF RIGHTS AND BENEFITS  (Pursuant to [place] 616C.050)    Notice of Injury or Occupational Disease (Incident Report Form C-1): If an injury or occupational disease (OD) arises out of and in the  course of employment, you must provide written notice to your employer as soon as practicable, but no later than 7 days after the accident or  OD. Your employer shall maintain a sufficient supply of the required forms.    Claim for Compensation (Form C-4): If medical treatment is sought, the form C-4 is available at the place of initial treatment. A completed  \"Claim for Compensation\" (Form C-4) must be filed within 90 days after an accident or OD. The treating physician or chiropractor must,  within 3 working days after treatment, complete and mail to the employer, the employer's insurer and third-party , the Claim for  Compensation.    Medical Treatment: If you require medical treatment for your on-the-job injury or OD, you may be required to select a physician or  chiropractor from a list provided by your workers’ compensation insurer, if it has " contracted with an Organization for Managed Care (MCO) or  Preferred Provider Organization (PPO) or providers of health care. If your employer has not entered into a contract with an MCO or PPO, you  may select a physician or chiropractor from the Panel of Physicians and Chiropractors. Any medical costs related to your industrial injury or  OD will be paid by your insurer.    Temporary Total Disability (TTD): If your doctor has certified that you are unable to work for a period of at least 5 consecutive days, or 5  cumulative days in a 20-day period, or places restrictions on you that your employer does not accommodate, you may be entitled to TTD  compensation.    Temporary Partial Disability (TPD): If the wage you receive upon reemployment is less than the compensation for TTD to which you are  entitled, the insurer may be required to pay you TPD compensation to make up the difference. TPD can only be paid for a maximum of 24  months.    Permanent Partial Disability (PPD): When your medical condition is stable and there is an indication of a PPD as a result of your injury or  OD, within 30 days, your insurer must arrange for an evaluation by a rating physician or chiropractor to determine the degree of your PPD. The  amount of your PPD award depends on the date of injury, the results of the PPD evaluation and your age and wage.    Permanent Total Disability (PTD): If you are medically certified by a treating physician or chiropractor as permanently and totally disabled  and have been granted a PTD status by your insurer, you are entitled to receive monthly benefits not to exceed 66 2/3% of your average  monthly wage. The amount of your PTD payments is subject to reduction if you previously received a PPD award.    Vocational Rehabilitation Services: You may be eligible for vocational rehabilitation services if you are unable to return to the job due to a  permanent physical impairment or permanent restrictions as a  result of your injury or occupational disease.    Transportation and Per Óscar Reimbursement: You may be eligible for travel expenses and per óscar associated with medical treatment.    Reopening: You may be able to reopen your claim if your condition worsens after claim closure.    Appeal Process: If you disagree with a written determination issued by the insurer or the insurer does not respond to your request, you may  appeal to the Department of Administration, , by following the instructions contained in your determination letter. You must  appeal the determination within 70 days from the date of the determination letter at [address], [address], [place], [place]  [address], or [address], [address], [place], [place] [address]. If you disagree with the  decision, you may appeal to the  Department of Administration, . You must file your appeal within 30 days from the date of the  decision  letter at [address], [address], [place], [place] [address], or [address], [place], [place] [address]. If you  disagree with a decision of an , you may file a petition for judicial review with the District Court. You must do so within 30  days of the Appeal Officer’s decision. You may be represented by an  at your own expense or you may contact the [facility] for possible  representation.    [place]  for Injured Workers (NAIW): If you disagree with a  decision, you may request that NAIW represent you  without charge at an  Hearing. For information regarding denial of benefits, you may contact the [facility] at: [address], [address], [place] [address], [phone], or [address], [place] [address], [phone]    To File a Complaint with the Division: If you wish to file a complaint with the  of the Division of  Industrial Relations (DIR),  please contact the Workers’ Compensation Section, [address], [address], [place], [place] [address], telephone [phone]) [phone], or  [address], [address], [place], [place] [address], telephone [phone].    For assistance with Workers’ Compensation Issues: you may contact the Office of the [employer], [address] [place] [address], [address], [place], [place] [address], Toll Free 1-486.568.3580, Web site: http://govInter[employer].[facility].[place]., E-mail  [email or web address].                                                                                                                                                                                                                                   __________________________________________________________________                                                                   _________________                Employee Name / Signature                                                                                                                                                       Date                                                                                                                                                                                                     D-2 (rev. 10/07)

## 2017-09-14 NOTE — PROGRESS NOTES
"Subjective:      Rosa M Easton is a 22 y.o. female who presents with Rash (NEW  Rash, pt states red chemicals are coming from the vents )      DOI: 9/14/17  This is linked to patient's prior complaint. She was seen two days ago for facial rash attributed to exposure to dust at work. She was treated with an antihistamine. Patient states her symptoms resolved when she leaves work. She returns today stating that she now has a pruritic rash to the right side of the back. She states several other co-workers have had the same symptoms, and that the building she works and had to be closed back in February for the same issue. Patient states the building was cleaned and she was told that this was no longer an issue. She reports moderate relief with antihistamines. She does have a follow-up appointment tomorrow in occupational health.      Rash   This is a new problem. The current episode started today. The problem is unchanged. Location: face, right upper back. The rash is characterized by redness and itchiness. Associated with: dust. Pertinent negatives include no cough, fever or shortness of breath. Treatments tried: benadryl. The treatment provided moderate relief.       Review of Systems   Constitutional: Negative for chills, fever and malaise/fatigue.   Respiratory: Negative for cough and shortness of breath.    Skin: Positive for itching and rash.   All other systems reviewed and are negative.         Objective:     /72   Pulse 64   Temp 36.6 °C (97.8 °F)   Resp 14   Ht 1.676 m (5' 6\")   Wt 78 kg (172 lb)   LMP 08/15/2017   SpO2 97%   BMI 27.76 kg/m²      Physical Exam   Constitutional: She appears well-developed and well-nourished. No distress.   Skin: Skin is warm and dry. Capillary refill takes less than 2 seconds. Rash noted. She is not diaphoretic.        Psychiatric: She has a normal mood and affect. Her behavior is normal. Judgment and thought content normal.       Face: noted B/L cheek " erythema in nature of contact dermatitis  Eyes: EOM are normal. Conjunctiva on left and right are injected. Pupils are equal, round, and reactive to light. Right eye exhibits no discharge. Left eye exhibits no discharge. No scleral icterus.   Back: There is a red raised patchy rash to the right upper back with a few scattered wheals.        Assessment/Plan:     1. Irritant contact dermatitis, unspecified trigger  -contiue benadryl  -refused RX for medrol dose vane  -See D39 for restrictions  -Keep appointment with [facility] tomorrow.

## 2017-09-14 NOTE — LETTER
[facility]   [address] [place] [address]  Phone: [phone] - Fax: [phone]        [facility] Progress Report and Disability Certification  Date of Service: 9/14/2017   No Show:  No  Date / Time of Next Visit: 9/15/2017   Claim Information   Patient Name: Rosa M Easton  Claim Number:     Employer:   [employer]  Date of Injury: 9/14/2017     Insurer / TPA: Misc Workers Comp  ID / SSN:     Occupation:   Diagnosis: The encounter diagnosis was Irritant contact dermatitis, unspecified trigger.    Medical Information   Related to Industrial Injury?   Comments:Unknown    Subjective Complaints:  DOI: 9/14/17  This is linked to patient's prior complaint. She was seen two days ago for facial rash attributed to exposure to dust at work. She was treated with an antihistamine. Patient states her symptoms resolved when she leaves work. She returns today stating that she now has a pruritic rash to the right side of the back. She states several other co-workers have had the same symptoms, and that the building she works and had to be closed back in February for the same issue. Patient states the building was cleaned and she was told that this was no longer an issue. She reports moderate relief with antihistamines. She does have a follow-up appointment tomorrow in occupational health.    Objective Findings: Face: noted B/L cheek erythema in nature of contact dermatitis  Eyes: EOM are normal. Conjunctiva on left and right are injected. Pupils are equal, round, and reactive to light. Right eye exhibits no discharge. Left eye exhibits no discharge. No scleral icterus.   Back: There is a red raised patchy rash to the right upper back with a few scattered wheals.    Pre-Existing Condition(s):     Assessment:   Condition Worsened    Status: Additional Care Required  Permanent Disability:No    Plan:   Comments:continue benadryl.  Refused RX  for prednisone.    Diagnostics:      Comments:       Disability Information   Status: Released to Full Duty    From:  9/14/2017  Through: 9/15/2017 Restrictions are:     Physical Restrictions   Sitting:    Standing:    Stooping:    Bending:      Squatting:    Walking:    Climbing:    Pushing:      Pulling:    Other:    Reaching Above Shoulder (L):   Reaching Above Shoulder (R):       Reaching Below Shoulder (L):    Reaching Below Shoulder (R):      Not to exceed Weight Limits   Carrying(hrs):   Weight Limit(lb):   Lifting(hrs):   Weight  Limit(lb):     Comments: Return tomorrow for occupational health visit. Refused RX for prednisone    Repetitive Actions   Hands: i.e. Fine Manipulations from Grasping:     Feet: i.e. Operating Foot Controls:     Driving / Operate Machinery:     Physician Name: Cathey J Hamman, A.P.N. Physician Signature: e-SignHAMMAN, CATHEY J A.P.N. e-Signature: Dr. Gilson Rausch, Medical Director   Clinic Name / Location: [address] [place] [address] Clinic Phone Number: Dept: [phone]   Appointment Time: 1:45 Pm Visit Start Time: 2:00 PM   Check-In Time:  1:54 Pm Visit Discharge Time:  @2:34PM    Original-Treating Physician or Chiropractor    Page 2-Insurer/TPA    Page 3-Employer    Page 4-Employee

## 2017-09-15 ENCOUNTER — OCCUPATIONAL MEDICINE (OUTPATIENT)
Dept: OCCUPATIONAL MEDICINE | Facility: CLINIC | Age: 22
End: 2017-09-15
Payer: COMMERCIAL

## 2017-09-15 VITALS
HEIGHT: 66 IN | DIASTOLIC BLOOD PRESSURE: 60 MMHG | RESPIRATION RATE: 14 BRPM | HEART RATE: 80 BPM | SYSTOLIC BLOOD PRESSURE: 102 MMHG | TEMPERATURE: 99 F | BODY MASS INDEX: 27.64 KG/M2 | WEIGHT: 172 LBS | OXYGEN SATURATION: 93 %

## 2017-09-15 DIAGNOSIS — R21 FACIAL RASH: ICD-10-CM

## 2017-09-15 DIAGNOSIS — L29.9 ITCHING: ICD-10-CM

## 2017-09-15 PROCEDURE — 99203 OFFICE O/P NEW LOW 30 MIN: CPT | Performed by: PREVENTIVE MEDICINE

## 2017-09-15 NOTE — LETTER
[facility]   [address] [place] [address],   [address] - [place] [address]  Phone: [phone] - Fax: [phone]        Occupational Health [facility] Progress Report and Disability Certification  Date of Service: 9/15/2017   No Show:  No  Date / Time of Next Visit: 10/13/2017   Claim Information   Patient Name: Rosa M Easton  Claim Number:     Employer:   *** Date of Injury: 9/14/2017     Insurer / TPA: Misc Workers Comp *** ID / SSN:     Occupation:  *** Diagnosis: There were no encounter diagnoses.    Medical Information   Related to Industrial Injury? No  Comments:recommend claim investigation and/or industrial hygiene consult of workplace ***   Subjective Complaints:  Date of incident 9/14/2017. Incident-cleaning at work and dust exposure caused itchy eyes and an rash. 22-year-old workers in follow-up of a rash and pruritus. At this time, she complains of persistent symptoms she complains of itchy facial rash. He complains of whole body itching including arms legs and back. She says this comes from dust exposure at work. Denies any fever, chills, or other constitutional symptoms. Denies any other allergic conditions.   Objective Findings: Appearance: Well-developed, well-nourished.   Mental Status: Mood and Affect normal. Pleasant. Cooperative. Appropriate.   ENT: Oropharynx clear. Moist mucous membranes. Hearing normal   Eyes: Pupils reactive. Conjunctiva normal. No scleral icterus.   Neck: Trachea Midline. No thyromegaly. No masses.  Cardiovascular: Normal rate. Regular rhythm. Normal heart sounds.   Chest: Effort normal. Breath sounds clear.   Skin: Skin shows malar rash on face. No other rash present at this time.  Musculoskeletal: No cyanosis, clubbing, or edema.     Pre-Existing Condition(s):     Assessment:   Condition Same    Status: Additional Care Required  Permanent Disability:No    Plan: Medication    Diagnostics:      Comments:  OTC hydrocortisone cream and OTC  cetirizine recommended. Recommend follow-up with personal physician to rule out atopic dermatitis and/or non-industrial causes of conjunctivitis.     Disability Information   Status: Released to Full Duty    From:  9/15/2017  Through: 10/13/2017 Restrictions are:     Physical Restrictions   Sitting:    Standing:    Stooping:    Bending:      Squatting:    Walking:    Climbing:    Pushing:      Pulling:    Other:    Reaching Above Shoulder (L):   Reaching Above Shoulder (R):       Reaching Below Shoulder (L):    Reaching Below Shoulder (R):      Not to exceed Weight Limits   Carrying(hrs):   Weight Limit(lb):   Lifting(hrs):   Weight  Limit(lb):     Comments: Recommend worker minimize exposure to dusts and other potential irritants.    Repetitive Actions   Hands: i.e. Fine Manipulations from Grasping:     Feet: i.e. Operating Foot Controls:     Driving / Operate Machinery:     Physician Name: Efren Tolbert M.D. Physician Signature: EFREN Urbano M.D. e-Signature:  , Medical Director   Clinic Name / Location: [address],   [address] [address] Clinic Phone Number: Dept: [phone]   Appointment Time: 4:20 Pm Visit Start Time: 4:32 PM   Check-In Time:  4:19 Pm Visit Discharge Time:  ***   Original-Treating Physician or Chiropractor    Page 2-Insurer/TPA    Page 3-Employer    Page 4-Employee

## 2017-09-15 NOTE — PROGRESS NOTES
Subjective:      Rosa M Easton is a 22 y.o. female who presents with Follow-Up ( [facility] 09-12-17 expossure room 2)      Date of incident 9/14/2017. Incident-cleaning at work and dust exposure caused itchy eyes and an rash. 22-year-old workers in follow-up of a rash and pruritus. At this time, she complains of persistent symptoms she complains of itchy facial rash. He complains of whole body itching including arms legs and back. She says this comes from dust exposure at work. Denies any fever, chills, or other constitutional symptoms. Denies any other allergic conditions.     HPI    ROS  Comprehensive medical history form reviewed. Pertinent positives and negatives included in HPI.    PFSH: reviewed in Epic    PMH:  has a past medical history of Allergy, unspecified not elsewhere classified and Depression. She also has no past medical history of ASTHMA or Diabetes.  MEDS:   Current Outpatient Prescriptions:   •  ketotifen (ZADITOR) 0.025 % ophthalmic solution, Place 1 Drop in both eyes 2 times a day., Disp: , Rfl:   •  DiphenhydrAMINE HCl (BENADRYL ALLERGY PO), Take  by mouth., Disp: , Rfl:   •  venlafaxine XR (EFFEXOR XR) 37.5 MG CAPSULE SR 24 HR, Take 1 Cap by mouth every morning with breakfast., Disp: 7 Cap, Rfl: 0  •  Venlafaxine HCl 75 MG TABLET SR 24 HR, Take 1 Tab by mouth every day., Disp: 30 Tab, Rfl: 2  •  Pseudoephedrine-APAP-DM (DAYQUIL PO), Take  by mouth., Disp: , Rfl:   •  sodium chloride (OCEAN) 0.65 % Solution, Spray 1 Spray in nose as needed for Congestion., Disp: , Rfl:   •  fluconazole (DIFLUCAN) 150 MG tablet, 1 tablet on day one, then 1 tablet PO on day 3 if no improvment, Disp: 2 Tab, Rfl: 2  •  predniSONE (DELTASONE) 20 MG Tab, 2 tablets PO x 3 days.  Take with food in the morning., Disp: 6 Tab, Rfl: 0  •  amoxicillin-clavulanate (AUGMENTIN) 875-125 MG Tab, Take 1 Tab by mouth 2 times a day., Disp: 20 Tab, Rfl: 0  •  Acetaminophen-Codeine 300-30 MG Tab, Take 2 Tabs by mouth every four  "hours as needed., Disp: 20 Tab, Rfl: 0  •  methylPREDNISolone (MEDROL DOSPACK) 4 MG TABS, U.D., Disp: 1 Tab, Rfl: 0  •  azithromycin (ZITHROMAX) 250 MG TABS, Use HOLLY as directed, Disp: 6 Tab, Rfl: 0  •  cetirizine (ZYRTEC) 10 MG TABS, Take 10 mg by mouth every day., Disp: , Rfl:   •  fexofenadine (ALLEGRA) 60 MG TABS, Take 60 mg by mouth every day., Disp: , Rfl:   •  ARIPiprazole (ABILIFY PO), Take  by mouth., Disp: , Rfl:   •  albuterol (VENTOLIN OR PROVENTIL) 108 (90 BASE) MCG/ACT AERS inhalation aerosol, Inhale 2 Puffs by mouth every 6 hours as needed for Shortness of Breath., Disp: 1 Inhaler, Rfl: 1  ALLERGIES: No Known Allergies  SURGHX:   Past Surgical History:   Procedure Laterality Date   • MENISCUS REPAIR  5/2014    Right     SOCHX:  reports that she has been smoking.  She has been smoking about 0.25 packs per day. She has never used smokeless tobacco. She reports that she does not drink alcohol or use drugs.  Work Status: Employer and Job Title reviewed per [place] C4 form  FH: No pertinent hereditary disorders.        Objective:     /60   Pulse 80   Temp 37.2 °C (99 °F)   Resp 14   Ht 1.676 m (5' 6\")   Wt 78 kg (172 lb)   SpO2 93%   BMI 27.76 kg/m²      Physical Exam    Appearance: Well-developed, well-nourished.   Mental Status: Mood and Affect normal. Pleasant. Cooperative. Appropriate.   ENT: Oropharynx clear. Moist mucous membranes. Hearing normal   Eyes: Pupils reactive. Conjunctiva normal. No scleral icterus.   Neck: Trachea Midline. No thyromegaly. No masses.  Cardiovascular: Normal rate. Regular rhythm. Normal heart sounds.   Chest: Effort normal. Breath sounds clear.   Skin: Skin shows malar rash on face. No other rash present at this time.  Musculoskeletal: No cyanosis, clubbing, or edema.         Assessment/Plan:     1. Facial rash  2. Itching  Indeterminate causation-I cannot say with any degree of medical certainty this is industrial disease. Will await final determination by " insurance.  OTC antihistamines and allergy medication.  OTC topical steroids  - Recheck one month or if claim denied follow-up personal physician

## 2017-09-15 NOTE — LETTER
[facility]   [address] [place] [address],   [address] [place] [address]  Phone: [phone] - Fax: [phone]        Occupational Health [facility] Progress Report and Disability Certification  Date of Service: 9/15/2017   No Show:  No  Date / Time of Next Visit: 10/13/2017   Claim Information   Patient Name: Rosa M Easton  Claim Number:     Employer:    Date of Injury: 9/14/2017     Insurer / TPA: [employer]  ID / SSN:     Occupation:   Diagnosis: Diagnoses of Facial rash and Itching were pertinent to this visit.    Medical Information   Related to Industrial Injury? No  Comments:recommend claim investigation and/or industrial hygiene consult of workplace    Subjective Complaints:  Date of incident 9/14/2017. Incident-cleaning at work and dust exposure caused itchy eyes and an rash. 22-year-old workers in follow-up of a rash and pruritus. At this time, she complains of persistent symptoms she complains of itchy facial rash. He complains of whole body itching including arms legs and back. She says this comes from dust exposure at work. Denies any fever, chills, or other constitutional symptoms. Denies any other allergic conditions.   Objective Findings: Appearance: Well-developed, well-nourished.   Mental Status: Mood and Affect normal. Pleasant. Cooperative. Appropriate.   ENT: Oropharynx clear. Moist mucous membranes. Hearing normal   Eyes: Pupils reactive. Conjunctiva normal. No scleral icterus.   Neck: Trachea Midline. No thyromegaly. No masses.  Cardiovascular: Normal rate. Regular rhythm. Normal heart sounds.   Chest: Effort normal. Breath sounds clear.   Skin: Skin shows malar rash on face. No other rash present at this time.  Musculoskeletal: No cyanosis, clubbing, or edema.     Pre-Existing Condition(s):     Assessment:   Condition Same    Status: Additional Care Required  Permanent Disability:No    Plan: Medication    Diagnostics:      Comments:  OTC  hydrocortisone cream and OTC cetirizine recommended. Recommend follow-up with personal physician to rule out atopic dermatitis and/or non-industrial causes of conjunctivitis.     Disability Information   Status: Released to Full Duty    From:  9/15/2017  Through: 10/13/2017 Restrictions are:     Physical Restrictions   Sitting:    Standing:    Stooping:    Bending:      Squatting:    Walking:    Climbing:    Pushing:      Pulling:    Other:    Reaching Above Shoulder (L):   Reaching Above Shoulder (R):       Reaching Below Shoulder (L):    Reaching Below Shoulder (R):      Not to exceed Weight Limits   Carrying(hrs):   Weight Limit(lb):   Lifting(hrs):   Weight  Limit(lb):     Comments: Recommend worker minimize exposure to dusts and other potential irritants.    Repetitive Actions   Hands: i.e. Fine Manipulations from Grasping:     Feet: i.e. Operating Foot Controls:     Driving / Operate Machinery:     Physician Name: Efren Tolbert M.D. Physician Signature: EFREN Urbano M.D. e-Signature:  , Medical Director   Clinic Name / Location: [address],   [address] [place] [address] Clinic Phone Number: Dept: [phone]   Appointment Time: 4:20 Pm Visit Start Time: 4:32 PM   Check-In Time:  4:19 Pm Visit Discharge Time: @11:02   Original-Treating Physician or Chiropractor    Page 2-Insurer/TPA    Page 3-Employer    Page 4-Employee

## 2017-12-20 VITALS
WEIGHT: 172.18 LBS | BODY MASS INDEX: 27.67 KG/M2 | HEART RATE: 92 BPM | TEMPERATURE: 97.6 F | DIASTOLIC BLOOD PRESSURE: 69 MMHG | SYSTOLIC BLOOD PRESSURE: 135 MMHG | OXYGEN SATURATION: 100 % | HEIGHT: 66 IN | RESPIRATION RATE: 20 BRPM

## 2017-12-20 LAB — EKG IMPRESSION: NORMAL

## 2017-12-20 PROCEDURE — 302449 STATCHG TRIAGE ONLY (STATISTIC)

## 2017-12-20 PROCEDURE — 93005 ELECTROCARDIOGRAM TRACING: CPT

## 2017-12-20 ASSESSMENT — PAIN SCALES - GENERAL: PAINLEVEL_OUTOF10: 7

## 2017-12-21 ENCOUNTER — HOSPITAL ENCOUNTER (EMERGENCY)
Facility: MEDICAL CENTER | Age: 22
End: 2017-12-21
Payer: COMMERCIAL

## 2017-12-21 NOTE — ED NOTES
"Chief Complaint   Patient presents with   • Shortness of Breath     x3 days   • Cough     x3 days   • Dizziness     x3 days   • Nausea/Vomiting/Diarrhea   • Body Aches     /69   Pulse 92   Temp 36.4 °C (97.6 °F)   Resp 20   Ht 1.676 m (5' 6\")   Wt 78.1 kg (172 lb 2.9 oz)   SpO2 100%   BMI 27.79 kg/m²     Pt ambulated into triage, complaints as above. EKG completed.  VS as above, NAD, encouraged to return to the triage nurse or tech with any new complaints or symptoms.  "
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
## 2018-09-06 ENCOUNTER — OFFICE VISIT (OUTPATIENT)
Dept: URGENT CARE | Facility: CLINIC | Age: 23
End: 2018-09-06
Payer: COMMERCIAL

## 2018-09-06 VITALS
OXYGEN SATURATION: 96 % | RESPIRATION RATE: 16 BRPM | WEIGHT: 185 LBS | HEART RATE: 85 BPM | DIASTOLIC BLOOD PRESSURE: 60 MMHG | TEMPERATURE: 97.5 F | HEIGHT: 66 IN | BODY MASS INDEX: 29.73 KG/M2 | SYSTOLIC BLOOD PRESSURE: 114 MMHG

## 2018-09-06 DIAGNOSIS — M62.838 CERVICAL PARASPINOUS MUSCLE SPASM: ICD-10-CM
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
## 2018-09-06 PROCEDURE — 99214 OFFICE O/P EST MOD 30 MIN: CPT | Performed by: PHYSICIAN ASSISTANT

## 2018-09-06 RX ORDER — CYCLOBENZAPRINE HCL 5 MG
5-10 TABLET ORAL 3 TIMES DAILY PRN
Qty: 40 TAB | Refills: 0 | Status: SHIPPED | OUTPATIENT
Start: 2018-09-06 | End: 2018-09-13

## 2018-09-06 RX ORDER — METHYLPREDNISOLONE 4 MG/1
TABLET ORAL
Qty: 1 KIT | Refills: 0 | Status: SHIPPED | OUTPATIENT
Start: 2018-09-06 | End: 2021-10-08

## 2018-09-06 RX ORDER — KETOROLAC TROMETHAMINE 30 MG/ML
60 INJECTION, SOLUTION INTRAMUSCULAR; INTRAVENOUS ONCE
Status: COMPLETED | OUTPATIENT
Start: 2018-09-06 | End: 2018-09-06

## 2018-09-06 RX ADMIN — KETOROLAC TROMETHAMINE 60 MG: 30 INJECTION, SOLUTION INTRAMUSCULAR; INTRAVENOUS at 19:04

## 2018-09-06 ASSESSMENT — ENCOUNTER SYMPTOMS
NAUSEA: 0
HEADACHES: 1
WHEEZING: 0
NECK PAIN: 1
SHORTNESS OF BREATH: 0
DIARRHEA: 0
BACK PAIN: 1
CHILLS: 0
ABDOMINAL PAIN: 0
FEVER: 0
VOMITING: 0

## 2018-09-06 ASSESSMENT — PAIN SCALES - GENERAL: PAINLEVEL: 8=MODERATE-SEVERE PAIN

## 2018-09-06 ASSESSMENT — PATIENT HEALTH QUESTIONNAIRE - PHQ9: CLINICAL INTERPRETATION OF PHQ2 SCORE: 0

## 2018-09-06 NOTE — LETTER
September 6, 2018       Patient: Rosa M Easton   YOB: 1995   Date of Visit: 9/6/2018         To Whom It May Concern:    It is my medical opinion that Rosa M Easton should be excused from work for tomorrow and Saturday due to illness / injury.      If you have any questions or concerns, please don't hesitate to call [phone]          Sincerely,          Herberth Beverly P.A.-C.  Electronically Signed
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
## 2018-11-10 ENCOUNTER — APPOINTMENT (OUTPATIENT)
Dept: RADIOLOGY | Facility: MEDICAL CENTER | Age: 23
End: 2018-11-10
Attending: EMERGENCY MEDICINE
Payer: COMMERCIAL

## 2018-11-10 ENCOUNTER — HOSPITAL ENCOUNTER (EMERGENCY)
Facility: MEDICAL CENTER | Age: 23
End: 2018-11-10
Attending: EMERGENCY MEDICINE
Payer: COMMERCIAL

## 2018-11-10 VITALS
BODY MASS INDEX: 32.24 KG/M2 | HEART RATE: 64 BPM | HEIGHT: 66 IN | WEIGHT: 200.62 LBS | RESPIRATION RATE: 18 BRPM | OXYGEN SATURATION: 98 % | TEMPERATURE: 98 F | DIASTOLIC BLOOD PRESSURE: 91 MMHG | SYSTOLIC BLOOD PRESSURE: 139 MMHG

## 2018-11-10 DIAGNOSIS — Z86.59 HISTORY OF DEPRESSION: ICD-10-CM

## 2018-11-10 DIAGNOSIS — F41.8 SITUATIONAL ANXIETY: ICD-10-CM

## 2018-11-10 DIAGNOSIS — R07.89 ATYPICAL CHEST PAIN: ICD-10-CM

## 2018-11-10 LAB
ALBUMIN SERPL BCP-MCNC: 4.6 G/DL (ref 3.2–4.9)
ALBUMIN/GLOB SERPL: 1.3 G/DL
ALP SERPL-CCNC: 54 U/L (ref 30–99)
ALT SERPL-CCNC: 16 U/L (ref 2–50)
ANION GAP SERPL CALC-SCNC: 10 MMOL/L (ref 0–11.9)
AST SERPL-CCNC: 17 U/L (ref 12–45)
BASOPHILS # BLD AUTO: 0.8 % (ref 0–1.8)
BASOPHILS # BLD: 0.06 K/UL (ref 0–0.12)
BILIRUB SERPL-MCNC: 0.4 MG/DL (ref 0.1–1.5)
BUN SERPL-MCNC: 14 MG/DL (ref 8–22)
CALCIUM SERPL-MCNC: 10 MG/DL (ref 8.5–10.5)
CHLORIDE SERPL-SCNC: 109 MMOL/L (ref 96–112)
CO2 SERPL-SCNC: 22 MMOL/L (ref 20–33)
CREAT SERPL-MCNC: 0.67 MG/DL (ref 0.5–1.4)
D DIMER PPP IA.FEU-MCNC: <0.4 UG/ML (FEU) (ref 0–0.5)
EKG IMPRESSION: NORMAL
EOSINOPHIL # BLD AUTO: 0.14 K/UL (ref 0–0.51)
EOSINOPHIL NFR BLD: 1.9 % (ref 0–6.9)
ERYTHROCYTE [DISTWIDTH] IN BLOOD BY AUTOMATED COUNT: 39 FL (ref 35.9–50)
GLOBULIN SER CALC-MCNC: 3.5 G/DL (ref 1.9–3.5)
GLUCOSE SERPL-MCNC: 88 MG/DL (ref 65–99)
HCT VFR BLD AUTO: 43.5 % (ref 37–47)
HGB BLD-MCNC: 15.4 G/DL (ref 12–16)
IMM GRANULOCYTES # BLD AUTO: 0.01 K/UL (ref 0–0.11)
IMM GRANULOCYTES NFR BLD AUTO: 0.1 % (ref 0–0.9)
LIPASE SERPL-CCNC: 19 U/L (ref 11–82)
LYMPHOCYTES # BLD AUTO: 2.05 K/UL (ref 1–4.8)
LYMPHOCYTES NFR BLD: 27.6 % (ref 22–41)
MCH RBC QN AUTO: 31 PG (ref 27–33)
MCHC RBC AUTO-ENTMCNC: 35.4 G/DL (ref 33.6–35)
MCV RBC AUTO: 87.7 FL (ref 81.4–97.8)
MONOCYTES # BLD AUTO: 0.48 K/UL (ref 0–0.85)
MONOCYTES NFR BLD AUTO: 6.5 % (ref 0–13.4)
NEUTROPHILS # BLD AUTO: 4.69 K/UL (ref 2–7.15)
NEUTROPHILS NFR BLD: 63.1 % (ref 44–72)
NRBC # BLD AUTO: 0 K/UL
NRBC BLD-RTO: 0 /100 WBC
PLATELET # BLD AUTO: 184 K/UL (ref 164–446)
PMV BLD AUTO: 10.3 FL (ref 9–12.9)
POTASSIUM SERPL-SCNC: 4.3 MMOL/L (ref 3.6–5.5)
PROT SERPL-MCNC: 8.1 G/DL (ref 6–8.2)
RBC # BLD AUTO: 4.96 M/UL (ref 4.2–5.4)
SODIUM SERPL-SCNC: 141 MMOL/L (ref 135–145)
TROPONIN I SERPL-MCNC: <0.01 NG/ML (ref 0–0.04)
WBC # BLD AUTO: 7.4 K/UL (ref 4.8–10.8)

## 2018-11-10 PROCEDURE — 85025 COMPLETE CBC W/AUTO DIFF WBC: CPT

## 2018-11-10 PROCEDURE — 83690 ASSAY OF LIPASE: CPT

## 2018-11-10 PROCEDURE — 93005 ELECTROCARDIOGRAM TRACING: CPT | Performed by: EMERGENCY MEDICINE

## 2018-11-10 PROCEDURE — 700111 HCHG RX REV CODE 636 W/ 250 OVERRIDE (IP): Performed by: EMERGENCY MEDICINE

## 2018-11-10 PROCEDURE — 85379 FIBRIN DEGRADATION QUANT: CPT

## 2018-11-10 PROCEDURE — 99284 EMERGENCY DEPT VISIT MOD MDM: CPT

## 2018-11-10 PROCEDURE — 71045 X-RAY EXAM CHEST 1 VIEW: CPT

## 2018-11-10 PROCEDURE — 80053 COMPREHEN METABOLIC PANEL: CPT

## 2018-11-10 PROCEDURE — 96374 THER/PROPH/DIAG INJ IV PUSH: CPT

## 2018-11-10 PROCEDURE — 93005 ELECTROCARDIOGRAM TRACING: CPT

## 2018-11-10 PROCEDURE — 84484 ASSAY OF TROPONIN QUANT: CPT

## 2018-11-10 RX ORDER — KETOROLAC TROMETHAMINE 30 MG/ML
15 INJECTION, SOLUTION INTRAMUSCULAR; INTRAVENOUS ONCE
Status: COMPLETED | OUTPATIENT
Start: 2018-11-10 | End: 2018-11-10

## 2018-11-10 RX ADMIN — KETOROLAC TROMETHAMINE 15 MG: 30 INJECTION, SOLUTION INTRAMUSCULAR at 10:31

## 2018-11-10 ASSESSMENT — PAIN SCALES - GENERAL
PAINLEVEL_OUTOF10: 7
PAINLEVEL_OUTOF10: 6

## 2018-11-10 ASSESSMENT — PAIN DESCRIPTION - DESCRIPTORS: DESCRIPTORS: SHARP

## 2018-11-10 NOTE — DISCHARGE INSTRUCTIONS
Chest Wall Pain  Introduction  Chest wall pain is pain in or around the bones and muscles of your chest. Sometimes, an injury causes this pain. Sometimes, the cause may not be known. This pain may take several weeks or longer to get better.  Follow these instructions at home:  Pay attention to any changes in your symptoms. Take these actions to help with your pain:  · Rest as told by your doctor.  · Avoid activities that cause pain. Try not to use your chest, belly (abdominal), or side muscles to lift heavy things.  · If directed, apply ice to the painful area:  ¨ Put ice in a plastic bag.  ¨ Place a towel between your skin and the bag.  ¨ Leave the ice on for 20 minutes, 2-3 times per day.  · Take over-the-counter and prescription medicines only as told by your doctor.  · Do not use tobacco products, including cigarettes, chewing tobacco, and e-cigarettes. If you need help quitting, ask your doctor.  · Keep all follow-up visits as told by your doctor. This is important.  Contact a doctor if:  · You have a fever.  · Your chest pain gets worse.  · You have new symptoms.  Get help right away if:  · You feel sick to your stomach (nauseous) or you throw up (vomit).  · You feel sweaty or light-headed.  · You have a cough with phlegm (sputum) or you cough up blood.  · You are short of breath.  This information is not intended to replace advice given to you by your health care provider. Make sure you discuss any questions you have with your health care provider.  Document Released: 06/05/2009 Document Revised: 05/25/2017 Document Reviewed: 03/14/2016  © 2017 Elsevier    Your chest x-ray, EKG, and laboratory testing were normal.  There is no evidence of any dangerous cause of your symptoms, including no pneumonia, no heart problems, and no evidence of a clot in your lungs.  The exact cause of your discomfort is not clear, but may be pinched nerve or pulled muscle or stress related.  Please take Tylenol or Motrin as  needed for pain.  You can alternate between those 2 medications every 3 hours.  Please follow-up with your primary care doctor.  Return to the emergency department for worsening or severe symptoms in the meantime.

## 2018-11-10 NOTE — ED NOTES
Pt discharged to home. Pt was given follow up instructions and note to be off work today. Pt verbalized understanding of all instructions for discharge and is ambulatory out of ED with steady gait.

## 2018-11-10 NOTE — ED TRIAGE NOTES
"Chief Complaint   Patient presents with   • Chest Pain     started this morning     EKG done prior to triage. Pt reports \"stabbing\" left sided chest pain with shortness of breath, pain with inspiration. Able to speak in full sentences.     /91   Pulse 73   Temp 36.7 °C (98 °F)   Resp 14   Ht 1.676 m (5' 6\")   Wt 91 kg (200 lb 9.9 oz)   SpO2 99%   BMI 32.38 kg/m²     Pt Informed regarding triage process and verbalized understanding to inform triage tech or RN for any changes in condition.  Placed in lobby.    "

## 2018-11-10 NOTE — ED PROVIDER NOTES
ED Provider Note    Scribed for Nawaf Everett M.D. by Nawaf Everett. 11/10/2018,  9:39 AM.    CHIEF COMPLAINT  Chief Complaint   Patient presents with   • Chest Pain     started this morning       HPI  Rosa M Easton is a 23 y.o. female with a history of depression, on Effexor and Abilify but no cardiopulmonary disease who presents to the Emergency Department complaining of stabbing left-sided chest pain with associated shortness of breath, and pleuritic symptoms, with pain worsening with inspiration.  Pain started this morning as she woke up.  She is ambulatory to her emergency department room with no difficulty.  EKG done prior to triage is unchanged compared to previous, with no ischemic findings.  She does have a birth control implant.  She is 1/4 pack a day smoker.  At the bedside, she is a very limited historian, answering in 1-2 word sentences, appears intensely anxious, and is slightly tearful.  She denies any new or acute life stressors, though earlier to the bedside nurse she reported a new job causing some stress, and does tell me that she recently moved out on her own.  She denies any falls or trauma or assault.  She denies breast tenderness or redness or swelling or drainage.  The pain is sharp, located behind her left breast, worse with palpation or inspiration, and certain movements.    REVIEW OF SYSTEMS  See \[facility] [place] [facility]\"" for further details. All other systems are negative.     PAST MEDICAL HISTORY   has a past medical history of Allergy, unspecified not elsewhere classified and Depression.    SOCIAL HISTORY  Social History     Social History Main Topics   • Smoking status: Current Every Day Smoker     Packs/day: 0.25     Types: Cigarettes   • Smokeless tobacco: Never Used   • Alcohol use Yes      Comment: rarely   • Drug use: No      Comment: denies   • Sexual activity: Not on file     History   Drug Use No     Comment: denies       SURGICAL HISTORY   has a past surgical history that includes  "meniscus repair (5/2014).    CURRENT MEDICATIONS  Home Medications    **Home medications have not yet been reviewed for this encounter**         ALLERGIES  No Known Allergies    PHYSICAL EXAM  VITAL SIGNS: /91   Pulse 64   Temp 36.7 °C (98 °F)   Resp 18   Ht 1.676 m (5' 6\")   Wt 91 kg (200 lb 9.9 oz)   SpO2 98%   BMI 32.38 kg/m²   Pulse ox interpretation: I interpret this pulse ox as normal.  Constitutional: Alert, in mild distress, intensely anxious.  HENT: No signs of trauma, Bilateral external ears normal, Nose normal.   Eyes: Conjunctiva normal, Non-icteric.   Neck: Normal range of motion, Supple, No stridor.   Lymphatic: No lymphadenopathy noted.   Cardiovascular: Regular rate and rhythm, no murmurs.   Thorax & Lungs: Normal breath sounds, No respiratory distress, No wheezing.  Abdomen: Bowel sounds normal, Soft, No tenderness, No masses, No pulsatile masses. No peritoneal signs.  Skin: Warm, Dry, No erythema, No rash.   Back: No midline bony tenderness.   Extremities: Intact distal pulses, No edema, No cyanosis.  Musculoskeletal: Good range of motion in all major joints. No or major deformities noted.   Neurologic: Alert , Normal motor function, Normal sensory function, No focal deficits noted.   Psychiatric: Affect anxious.  Not suicidal or homicidal.  No evidence of hallucinations or delusions.    DIAGNOSTIC STUDIES / PROCEDURES    EKG  Interpreted by me    Rhythm:  Normal sinus rhythm   Rate: 73  Axis: normal  Intervals: normal  Ectopy: none  Conduction: normal  ST Segments: no acute change  T Waves: no acute change  Q Waves: none  Compared to ECG 12/20/2017 23:21:58  NO CHANGE SINCE PREVIOUS ECG     LABS  Labs Reviewed   CBC WITH DIFFERENTIAL - Abnormal; Notable for the following:        Result Value    MCHC 35.4 (*)     All other components within normal limits   COMP METABOLIC PANEL   LIPASE   TROPONIN   D-DIMER   ESTIMATED GFR     All labs reviewed by me.    RADIOLOGY  DX-CHEST-LIMITED (1 " VIEW)   Final Result      No evidence of acute cardiopulmonary process.        The radiologist's interpretation of all radiological studies have been reviewed by me.    COURSE & MEDICAL DECISION MAKING  Nursing notes, VS, PMSFHx reviewed in chart.     9:39 AM Patient seen and examined at bedside. Differential diagnosis includes but is not limited to ACS, PE, pneumonia, pleuritis, musculoskeletal chest pain.  The patient's affect and slight tremulousness and apparent anxiety/tearfulness strongly suggest an emotional component of her symptoms.  Ordered for EKG, cardiac monitoring, chest x-ray, and screening laboratory tests including troponin and d-dimer, given that she is a smoker, with a birth control implant, who has pleuritic chest pain. Patient will be treated with Toradol for her symptoms.     10:48 AM this patient's labs are unremarkable, including a negative d-dimer and negative troponin.  Her chest x-ray was also unremarkable, and I had a very low pretest probability for acute coronary syndrome, pneumonia, or a PE.  The exact cause of her symptoms is not clear, but given her affect and history, and recent life stressors, I think stress related symptoms are a distinct possibility.  This may also be musculoskeletal pain.  I let the patient know that she should alternate between Tylenol and see her primary care doctor, and return for severe or worsening symptoms in the meantime.     The patient will return for new or worsening symptoms and is stable at the time of discharge.    The patient is referred to a primary physician for blood pressure management, diabetic screening, and for all other preventative health concerns.    DISPOSITION:  Patient will be discharged home in stable condition.    FOLLOW UP:  Lesa Caraballo M.D.  [address]  [place] [address]  [phone]    Schedule an appointment as soon as possible for a visit       [facility], Emergency Dept  [address]  [place] [id number] [phone]    for severe symptoms.      OUTPATIENT MEDICATIONS:  Discharge Medication List as of 11/10/2018 11:10 AM            FINAL IMPRESSION  1. Atypical chest pain Acute   2. Situational anxiety Active   3. History of depression Chronic

## 2019-01-30 ENCOUNTER — OFFICE VISIT (OUTPATIENT)
Dept: URGENT CARE | Facility: CLINIC | Age: 24
End: 2019-01-30
Payer: COMMERCIAL

## 2019-01-30 VITALS
HEIGHT: 66 IN | WEIGHT: 216 LBS | HEART RATE: 92 BPM | SYSTOLIC BLOOD PRESSURE: 118 MMHG | DIASTOLIC BLOOD PRESSURE: 60 MMHG | RESPIRATION RATE: 16 BRPM | TEMPERATURE: 98.4 F | OXYGEN SATURATION: 98 % | BODY MASS INDEX: 34.72 KG/M2

## 2019-01-30 DIAGNOSIS — J00 NASOPHARYNGITIS: ICD-10-CM

## 2019-01-30 DIAGNOSIS — J40 BRONCHITIS: ICD-10-CM

## 2019-01-30 PROCEDURE — 99214 OFFICE O/P EST MOD 30 MIN: CPT | Performed by: PHYSICIAN ASSISTANT

## 2019-01-30 RX ORDER — PROMETHAZINE HYDROCHLORIDE AND CODEINE PHOSPHATE 6.25; 1 MG/5ML; MG/5ML
5-10 SYRUP ORAL 3 TIMES DAILY PRN
Qty: 150 ML | Refills: 0 | Status: SHIPPED | OUTPATIENT
Start: 2019-01-30 | End: 2019-02-04

## 2019-01-30 RX ORDER — CEFUROXIME AXETIL 500 MG/1
500 TABLET ORAL 2 TIMES DAILY
Qty: 20 TAB | Refills: 0 | Status: SHIPPED | OUTPATIENT
Start: 2019-01-30 | End: 2019-02-09

## 2019-01-30 ASSESSMENT — ENCOUNTER SYMPTOMS
COUGH: 1
SHORTNESS OF BREATH: 0
CARDIOVASCULAR NEGATIVE: 1
CONSTITUTIONAL NEGATIVE: 1
EYES NEGATIVE: 1
SORE THROAT: 0
FEVER: 0
WHEEZING: 0

## 2019-01-30 NOTE — PROGRESS NOTES
"Subjective:      Rosa M Easton is a 24 y.o. female who presents with Cough (x2weeks, cough, sob, dizziness, fatigue)            Cough   This is a new problem. The current episode started in the past 7 days. The problem has been unchanged. The problem occurs every few minutes. The cough is productive of sputum. Pertinent negatives include no fever, nasal congestion, sore throat, shortness of breath or wheezing. Nothing aggravates the symptoms. She has tried nothing for the symptoms. The treatment provided no relief. There is no history of asthma.       Review of Systems   Constitutional: Negative.  Negative for fever.   HENT: Negative.  Negative for sore throat.    Eyes: Negative.    Respiratory: Positive for cough. Negative for shortness of breath and wheezing.    Cardiovascular: Negative.    Skin: Negative.           Objective:     /60 (BP Location: Left arm, Patient Position: Sitting, BP Cuff Size: Adult)   Pulse 92   Temp 36.9 °C (98.4 °F) (Temporal)   Resp 16   Ht 1.676 m (5' 6\")   Wt 98 kg (216 lb)   SpO2 98%   BMI 34.86 kg/m²      Physical Exam   Constitutional: She is oriented to person, place, and time. She appears well-developed and well-nourished. No distress.   HENT:   Head: Normocephalic and atraumatic.   Mouth/Throat: Oropharynx is clear and moist.   Eyes: Pupils are equal, round, and reactive to light. Conjunctivae and EOM are normal.   Neck: Normal range of motion. Neck supple.   Cardiovascular: Normal rate, regular rhythm and normal heart sounds.    Pulmonary/Chest: Effort normal and breath sounds normal. No respiratory distress. She has no wheezes. She has no rales.   Lymphadenopathy:     She has no cervical adenopathy.   Neurological: She is alert and oriented to person, place, and time.   Skin: Skin is warm and dry.   Psychiatric: She has a normal mood and affect. Her behavior is normal.   Nursing note and vitals reviewed.    Active Ambulatory Problems     Diagnosis Date Noted   • " Overdose 03/27/2015     Resolved Ambulatory Problems     Diagnosis Date Noted   • No Resolved Ambulatory Problems     Past Medical History:   Diagnosis Date   • Allergy, unspecified not elsewhere classified    • Depression      Current Outpatient Prescriptions on File Prior to Visit   Medication Sig Dispense Refill   • Pseudoephedrine-APAP-DM (DAYQUIL PO) Take  by mouth.     • MethylPREDNISolone (MEDROL DOSEPAK) 4 MG Tablet Therapy Pack Take as directed on package.  Dispense one package. (Patient not taking: Reported on 1/30/2019) 1 Kit 0   • ketotifen (ZADITOR) 0.025 % ophthalmic solution Place 1 Drop in both eyes 2 times a day.     • venlafaxine XR (EFFEXOR XR) 37.5 MG CAPSULE SR 24 HR Take 1 Cap by mouth every morning with breakfast. (Patient not taking: Reported on 9/6/2018) 7 Cap 0   • Venlafaxine HCl 75 MG TABLET SR 24 HR Take 1 Tab by mouth every day. (Patient not taking: Reported on 9/6/2018) 30 Tab 2   • sodium chloride (OCEAN) 0.65 % Solution Spray 1 Spray in nose as needed for Congestion.     • fluconazole (DIFLUCAN) 150 MG tablet 1 tablet on day one, then 1 tablet PO on day 3 if no improvment (Patient not taking: Reported on 9/6/2018) 2 Tab 2   • predniSONE (DELTASONE) 20 MG Tab 2 tablets PO x 3 days.  Take with food in the morning. (Patient not taking: Reported on 9/6/2018) 6 Tab 0   • amoxicillin-clavulanate (AUGMENTIN) 875-125 MG Tab Take 1 Tab by mouth 2 times a day. (Patient not taking: Reported on 9/6/2018) 20 Tab 0   • Acetaminophen-Codeine 300-30 MG Tab Take 2 Tabs by mouth every four hours as needed. (Patient not taking: Reported on 9/6/2018) 20 Tab 0   • methylPREDNISolone (MEDROL DOSPACK) 4 MG TABS U.D. (Patient not taking: Reported on 9/6/2018) 1 Tab 0   • azithromycin (ZITHROMAX) 250 MG TABS Use HOLLY as directed (Patient not taking: Reported on 9/6/2018) 6 Tab 0   • cetirizine (ZYRTEC) 10 MG TABS Take 10 mg by mouth every day.     • fexofenadine (ALLEGRA) 60 MG TABS Take 60 mg by mouth every  day.     • ARIPiprazole (ABILIFY PO) Take  by mouth.     • DiphenhydrAMINE HCl (BENADRYL ALLERGY PO) Take  by mouth.     • albuterol (VENTOLIN OR PROVENTIL) 108 (90 BASE) MCG/ACT AERS inhalation aerosol Inhale 2 Puffs by mouth every 6 hours as needed for Shortness of Breath. (Patient not taking: Reported on 1/30/2019) 1 Inhaler 1     No current facility-administered medications on file prior to visit.      Social History     Social History   • Marital status: Single     Spouse name: N/A   • Number of children: N/A   • Years of education: N/A     Occupational History   • Not on file.     Social History Main Topics   • Smoking status: Former Smoker     Packs/day: 0.25     Types: Cigarettes     Quit date: 11/30/2018   • Smokeless tobacco: Never Used   • Alcohol use Yes      Comment: rarely   • Drug use: No      Comment: denies   • Sexual activity: Not on file     Other Topics Concern   • Not on file     Social History Narrative   • No narrative on file     No family history on file.  Patient has no known allergies.              Assessment/Plan:     ·  bronchitis, NASOPHARYNGITIS  ·       · Start w/MucinexD; nsaids;rx abx;   ·

## 2019-01-30 NOTE — LETTER
January 30, 2019         Patient: Rosa M Easton   YOB: 1995   Date of Visit: 1/30/2019           To Whom it May Concern:    Rosa M Easton was seen in my clinic on 1/30/2019 for illness this week; please excuse Wednesday thru Friday.    If you have any questions or concerns, please don't hesitate to call.        Sincerely,           Charli Maza P.A.-C.  Electronically Signed

## 2021-09-21 PROBLEM — R22.32 MASS OF FINGER OF LEFT HAND: Status: ACTIVE | Noted: 2021-09-21

## 2021-10-04 ENCOUNTER — HOSPITAL ENCOUNTER (OUTPATIENT)
Facility: MEDICAL CENTER | Age: 26
End: 2021-10-04
Attending: ANESTHESIOLOGY
Payer: COMMERCIAL

## 2021-10-04 LAB — COVID ORDER STATUS COVID19: NORMAL

## 2021-10-04 PROCEDURE — U0003 INFECTIOUS AGENT DETECTION BY NUCLEIC ACID (DNA OR RNA); SEVERE ACUTE RESPIRATORY SYNDROME CORONAVIRUS 2 (SARS-COV-2) (CORONAVIRUS DISEASE [COVID-19]), AMPLIFIED PROBE TECHNIQUE, MAKING USE OF HIGH THROUGHPUT TECHNOLOGIES AS DESCRIBED BY CMS-2020-01-R: HCPCS

## 2021-10-04 PROCEDURE — U0005 INFEC AGEN DETEC AMPLI PROBE: HCPCS

## 2021-10-05 LAB
SARS-COV-2 RNA RESP QL NAA+PROBE: NOTDETECTED
SPECIMEN SOURCE: NORMAL

## 2021-10-08 ENCOUNTER — HOSPITAL ENCOUNTER (INPATIENT)
Facility: MEDICAL CENTER | Age: 26
LOS: 3 days | DRG: 177 | End: 2021-10-11
Attending: EMERGENCY MEDICINE | Admitting: HOSPITALIST
Payer: COMMERCIAL

## 2021-10-08 ENCOUNTER — APPOINTMENT (OUTPATIENT)
Dept: RADIOLOGY | Facility: MEDICAL CENTER | Age: 26
DRG: 177 | End: 2021-10-08
Attending: EMERGENCY MEDICINE
Payer: COMMERCIAL

## 2021-10-08 DIAGNOSIS — T17.908A ASPIRATION INTO AIRWAY, INITIAL ENCOUNTER: ICD-10-CM

## 2021-10-08 DIAGNOSIS — R09.02 HYPOXIA: ICD-10-CM

## 2021-10-08 DIAGNOSIS — J18.9 MULTIFOCAL PNEUMONIA: ICD-10-CM

## 2021-10-08 DIAGNOSIS — J96.01 ACUTE RESPIRATORY FAILURE WITH HYPOXIA (HCC): ICD-10-CM

## 2021-10-08 LAB
ALBUMIN SERPL BCP-MCNC: 4.2 G/DL (ref 3.2–4.9)
ALBUMIN/GLOB SERPL: 1.4 G/DL
ALP SERPL-CCNC: 63 U/L (ref 30–99)
ALT SERPL-CCNC: 57 U/L (ref 2–50)
ANION GAP SERPL CALC-SCNC: 15 MMOL/L (ref 7–16)
AST SERPL-CCNC: 39 U/L (ref 12–45)
BASOPHILS # BLD AUTO: 0.4 % (ref 0–1.8)
BASOPHILS # BLD: 0.06 K/UL (ref 0–0.12)
BILIRUB SERPL-MCNC: 0.3 MG/DL (ref 0.1–1.5)
BUN SERPL-MCNC: 15 MG/DL (ref 8–22)
CALCIUM SERPL-MCNC: 8.9 MG/DL (ref 8.5–10.5)
CHLORIDE SERPL-SCNC: 106 MMOL/L (ref 96–112)
CO2 SERPL-SCNC: 19 MMOL/L (ref 20–33)
CREAT SERPL-MCNC: 0.72 MG/DL (ref 0.5–1.4)
D DIMER PPP IA.FEU-MCNC: 1.42 UG/ML (FEU) (ref 0–0.5)
EKG IMPRESSION: NORMAL
EOSINOPHIL # BLD AUTO: 0.03 K/UL (ref 0–0.51)
EOSINOPHIL NFR BLD: 0.2 % (ref 0–6.9)
ERYTHROCYTE [DISTWIDTH] IN BLOOD BY AUTOMATED COUNT: 40.8 FL (ref 35.9–50)
FLUAV RNA SPEC QL NAA+PROBE: NEGATIVE
FLUBV RNA SPEC QL NAA+PROBE: NEGATIVE
GLOBULIN SER CALC-MCNC: 3 G/DL (ref 1.9–3.5)
GLUCOSE SERPL-MCNC: 147 MG/DL (ref 65–99)
HCG SERPL QL: NEGATIVE
HCT VFR BLD AUTO: 38.5 % (ref 37–47)
HGB BLD-MCNC: 13.8 G/DL (ref 12–16)
IMM GRANULOCYTES # BLD AUTO: 0.08 K/UL (ref 0–0.11)
IMM GRANULOCYTES NFR BLD AUTO: 0.5 % (ref 0–0.9)
LYMPHOCYTES # BLD AUTO: 1.29 K/UL (ref 1–4.8)
LYMPHOCYTES NFR BLD: 7.7 % (ref 22–41)
MAGNESIUM SERPL-MCNC: 1.9 MG/DL (ref 1.5–2.5)
MCH RBC QN AUTO: 31.4 PG (ref 27–33)
MCHC RBC AUTO-ENTMCNC: 35.8 G/DL (ref 33.6–35)
MCV RBC AUTO: 87.7 FL (ref 81.4–97.8)
MONOCYTES # BLD AUTO: 0.29 K/UL (ref 0–0.85)
MONOCYTES NFR BLD AUTO: 1.7 % (ref 0–13.4)
NEUTROPHILS # BLD AUTO: 15.07 K/UL (ref 2–7.15)
NEUTROPHILS NFR BLD: 89.5 % (ref 44–72)
NRBC # BLD AUTO: 0 K/UL
NRBC BLD-RTO: 0 /100 WBC
PLATELET # BLD AUTO: 193 K/UL (ref 164–446)
PMV BLD AUTO: 10.6 FL (ref 9–12.9)
POTASSIUM SERPL-SCNC: 3.5 MMOL/L (ref 3.6–5.5)
PROT SERPL-MCNC: 7.2 G/DL (ref 6–8.2)
RBC # BLD AUTO: 4.39 M/UL (ref 4.2–5.4)
RSV RNA SPEC QL NAA+PROBE: NEGATIVE
SARS-COV-2 RNA RESP QL NAA+PROBE: NOTDETECTED
SODIUM SERPL-SCNC: 140 MMOL/L (ref 135–145)
SPECIMEN SOURCE: NORMAL
WBC # BLD AUTO: 16.8 K/UL (ref 4.8–10.8)

## 2021-10-08 PROCEDURE — 700111 HCHG RX REV CODE 636 W/ 250 OVERRIDE (IP): Performed by: INTERNAL MEDICINE

## 2021-10-08 PROCEDURE — 770001 HCHG ROOM/CARE - MED/SURG/GYN PRIV*

## 2021-10-08 PROCEDURE — 80053 COMPREHEN METABOLIC PANEL: CPT

## 2021-10-08 PROCEDURE — 96365 THER/PROPH/DIAG IV INF INIT: CPT

## 2021-10-08 PROCEDURE — 84703 CHORIONIC GONADOTROPIN ASSAY: CPT

## 2021-10-08 PROCEDURE — 700102 HCHG RX REV CODE 250 W/ 637 OVERRIDE(OP): Performed by: INTERNAL MEDICINE

## 2021-10-08 PROCEDURE — C9803 HOPD COVID-19 SPEC COLLECT: HCPCS | Performed by: EMERGENCY MEDICINE

## 2021-10-08 PROCEDURE — 700117 HCHG RX CONTRAST REV CODE 255: Performed by: EMERGENCY MEDICINE

## 2021-10-08 PROCEDURE — A9270 NON-COVERED ITEM OR SERVICE: HCPCS | Performed by: INTERNAL MEDICINE

## 2021-10-08 PROCEDURE — 83735 ASSAY OF MAGNESIUM: CPT

## 2021-10-08 PROCEDURE — 0241U HCHG SARS-COV-2 COVID-19 NFCT DS RESP RNA 4 TRGT MIC: CPT

## 2021-10-08 PROCEDURE — 700111 HCHG RX REV CODE 636 W/ 250 OVERRIDE (IP): Performed by: EMERGENCY MEDICINE

## 2021-10-08 PROCEDURE — 700105 HCHG RX REV CODE 258: Performed by: INTERNAL MEDICINE

## 2021-10-08 PROCEDURE — 71275 CT ANGIOGRAPHY CHEST: CPT

## 2021-10-08 PROCEDURE — 99285 EMERGENCY DEPT VISIT HI MDM: CPT

## 2021-10-08 PROCEDURE — 85379 FIBRIN DEGRADATION QUANT: CPT

## 2021-10-08 PROCEDURE — 85025 COMPLETE CBC W/AUTO DIFF WBC: CPT

## 2021-10-08 PROCEDURE — 71045 X-RAY EXAM CHEST 1 VIEW: CPT

## 2021-10-08 PROCEDURE — 84145 PROCALCITONIN (PCT): CPT

## 2021-10-08 PROCEDURE — 87040 BLOOD CULTURE FOR BACTERIA: CPT

## 2021-10-08 PROCEDURE — 99221 1ST HOSP IP/OBS SF/LOW 40: CPT | Mod: GC | Performed by: HOSPITALIST

## 2021-10-08 PROCEDURE — 700105 HCHG RX REV CODE 258: Performed by: EMERGENCY MEDICINE

## 2021-10-08 PROCEDURE — 93005 ELECTROCARDIOGRAM TRACING: CPT | Performed by: EMERGENCY MEDICINE

## 2021-10-08 RX ORDER — PROCHLORPERAZINE EDISYLATE 5 MG/ML
5-10 INJECTION INTRAMUSCULAR; INTRAVENOUS EVERY 4 HOURS PRN
Status: DISCONTINUED | OUTPATIENT
Start: 2021-10-08 | End: 2021-10-11 | Stop reason: HOSPADM

## 2021-10-08 RX ORDER — HEPARIN SODIUM 5000 [USP'U]/ML
5000 INJECTION, SOLUTION INTRAVENOUS; SUBCUTANEOUS EVERY 8 HOURS
Status: DISCONTINUED | OUTPATIENT
Start: 2021-10-08 | End: 2021-10-11 | Stop reason: HOSPADM

## 2021-10-08 RX ORDER — BISACODYL 10 MG
10 SUPPOSITORY, RECTAL RECTAL
Status: DISCONTINUED | OUTPATIENT
Start: 2021-10-08 | End: 2021-10-11 | Stop reason: HOSPADM

## 2021-10-08 RX ORDER — CALCIUM CARBONATE 500 MG/1
500 TABLET, CHEWABLE ORAL
COMMUNITY
End: 2022-01-16

## 2021-10-08 RX ORDER — ACETAMINOPHEN 500 MG
500 TABLET ORAL EVERY 6 HOURS PRN
COMMUNITY
End: 2022-01-16

## 2021-10-08 RX ORDER — ONDANSETRON 4 MG/1
4 TABLET, ORALLY DISINTEGRATING ORAL EVERY 4 HOURS PRN
Status: DISCONTINUED | OUTPATIENT
Start: 2021-10-08 | End: 2021-10-11 | Stop reason: HOSPADM

## 2021-10-08 RX ORDER — PROMETHAZINE HYDROCHLORIDE 25 MG/1
12.5-25 TABLET ORAL EVERY 4 HOURS PRN
Status: DISCONTINUED | OUTPATIENT
Start: 2021-10-08 | End: 2021-10-11 | Stop reason: HOSPADM

## 2021-10-08 RX ORDER — AZITHROMYCIN 250 MG/1
500 TABLET, FILM COATED ORAL DAILY
Status: DISCONTINUED | OUTPATIENT
Start: 2021-10-08 | End: 2021-10-08

## 2021-10-08 RX ORDER — AMOXICILLIN 250 MG
2 CAPSULE ORAL 2 TIMES DAILY
Status: DISCONTINUED | OUTPATIENT
Start: 2021-10-08 | End: 2021-10-11 | Stop reason: HOSPADM

## 2021-10-08 RX ORDER — OXYMETAZOLINE HYDROCHLORIDE 0.05 G/100ML
1 SPRAY NASAL 2 TIMES DAILY PRN
COMMUNITY
End: 2022-01-16

## 2021-10-08 RX ORDER — AZITHROMYCIN 250 MG/1
500 TABLET, FILM COATED ORAL DAILY
Status: DISCONTINUED | OUTPATIENT
Start: 2021-10-09 | End: 2021-10-08

## 2021-10-08 RX ORDER — GUAIFENESIN 600 MG/1
600 TABLET, EXTENDED RELEASE ORAL EVERY 12 HOURS
Status: DISCONTINUED | OUTPATIENT
Start: 2021-10-08 | End: 2021-10-11 | Stop reason: HOSPADM

## 2021-10-08 RX ORDER — ACETAMINOPHEN 500 MG
1000 TABLET ORAL 3 TIMES DAILY PRN
Status: DISCONTINUED | OUTPATIENT
Start: 2021-10-08 | End: 2021-10-11 | Stop reason: HOSPADM

## 2021-10-08 RX ORDER — ACETAMINOPHEN 325 MG/1
650 TABLET ORAL EVERY 6 HOURS PRN
Status: DISCONTINUED | OUTPATIENT
Start: 2021-10-08 | End: 2021-10-08

## 2021-10-08 RX ORDER — LABETALOL HYDROCHLORIDE 5 MG/ML
10 INJECTION, SOLUTION INTRAVENOUS EVERY 4 HOURS PRN
Status: DISCONTINUED | OUTPATIENT
Start: 2021-10-08 | End: 2021-10-11 | Stop reason: HOSPADM

## 2021-10-08 RX ORDER — ACETAMINOPHEN 500 MG
1000 TABLET ORAL 4 TIMES DAILY PRN
Status: DISCONTINUED | OUTPATIENT
Start: 2021-10-08 | End: 2021-10-08

## 2021-10-08 RX ORDER — POLYETHYLENE GLYCOL 3350 17 G/17G
1 POWDER, FOR SOLUTION ORAL
Status: DISCONTINUED | OUTPATIENT
Start: 2021-10-08 | End: 2021-10-11 | Stop reason: HOSPADM

## 2021-10-08 RX ORDER — AZITHROMYCIN 250 MG/1
500 TABLET, FILM COATED ORAL EVERY EVENING
Status: DISCONTINUED | OUTPATIENT
Start: 2021-10-08 | End: 2021-10-09

## 2021-10-08 RX ORDER — LORATADINE 10 MG/1
10 TABLET ORAL
COMMUNITY
End: 2022-01-16

## 2021-10-08 RX ORDER — POTASSIUM CHLORIDE 20 MEQ/1
20 TABLET, EXTENDED RELEASE ORAL ONCE
Status: COMPLETED | OUTPATIENT
Start: 2021-10-08 | End: 2021-10-08

## 2021-10-08 RX ADMIN — POTASSIUM CHLORIDE 20 MEQ: 1500 TABLET, EXTENDED RELEASE ORAL at 20:15

## 2021-10-08 RX ADMIN — GUAIFENESIN 600 MG: 600 TABLET, EXTENDED RELEASE ORAL at 20:14

## 2021-10-08 RX ADMIN — ACETAMINOPHEN 650 MG: 325 TABLET, FILM COATED ORAL at 17:02

## 2021-10-08 RX ADMIN — SODIUM CHLORIDE 3 G: 900 INJECTION INTRAVENOUS at 20:16

## 2021-10-08 RX ADMIN — ONDANSETRON 4 MG: 4 TABLET, ORALLY DISINTEGRATING ORAL at 16:49

## 2021-10-08 RX ADMIN — AZITHROMYCIN MONOHYDRATE 500 MG: 250 TABLET ORAL at 20:15

## 2021-10-08 RX ADMIN — IOHEXOL 35 ML: 350 INJECTION, SOLUTION INTRAVENOUS at 13:15

## 2021-10-08 RX ADMIN — SODIUM CHLORIDE 3 G: 900 INJECTION INTRAVENOUS at 14:37

## 2021-10-08 ASSESSMENT — LIFESTYLE VARIABLES
EVER HAD A DRINK FIRST THING IN THE MORNING TO STEADY YOUR NERVES TO GET RID OF A HANGOVER: NO
ALCOHOL_USE: NO
TOTAL SCORE: 0
DOES PATIENT WANT TO STOP DRINKING: NO
TOTAL SCORE: 0
HAVE PEOPLE ANNOYED YOU BY CRITICIZING YOUR DRINKING: NO
AVERAGE NUMBER OF DAYS PER WEEK YOU HAVE A DRINK CONTAINING ALCOHOL: 0
CONSUMPTION TOTAL: NEGATIVE
EVER FELT BAD OR GUILTY ABOUT YOUR DRINKING: NO
HOW MANY TIMES IN THE PAST YEAR HAVE YOU HAD 5 OR MORE DRINKS IN A DAY: 0
ON A TYPICAL DAY WHEN YOU DRINK ALCOHOL HOW MANY DRINKS DO YOU HAVE: 0
HAVE YOU EVER FELT YOU SHOULD CUT DOWN ON YOUR DRINKING: NO
TOTAL SCORE: 0

## 2021-10-08 ASSESSMENT — PATIENT HEALTH QUESTIONNAIRE - PHQ9
SUM OF ALL RESPONSES TO PHQ9 QUESTIONS 1 AND 2: 0
2. FEELING DOWN, DEPRESSED, IRRITABLE, OR HOPELESS: NOT AT ALL
1. LITTLE INTEREST OR PLEASURE IN DOING THINGS: NOT AT ALL

## 2021-10-08 ASSESSMENT — ENCOUNTER SYMPTOMS
EYE DISCHARGE: 0
CHILLS: 0
ORTHOPNEA: 1
NERVOUS/ANXIOUS: 0
PND: 0
SPUTUM PRODUCTION: 1
WHEEZING: 0
PALPITATIONS: 0
DIZZINESS: 0
FEVER: 0
ABDOMINAL PAIN: 0
EYE PAIN: 0
COUGH: 1
BLOOD IN STOOL: 0
DIARRHEA: 0
MYALGIAS: 0
SHORTNESS OF BREATH: 1
CONSTIPATION: 0

## 2021-10-08 ASSESSMENT — COGNITIVE AND FUNCTIONAL STATUS - GENERAL
DAILY ACTIVITIY SCORE: 24
MOBILITY SCORE: 24
SUGGESTED CMS G CODE MODIFIER MOBILITY: CH
SUGGESTED CMS G CODE MODIFIER DAILY ACTIVITY: CH

## 2021-10-08 ASSESSMENT — FIBROSIS 4 INDEX: FIB4 SCORE: 0.64

## 2021-10-08 NOTE — ED NOTES
Med rec completed per Pt and significant other at bedside.  Allergies reviewed with Pt. No known DRUG allergies.  Pt denies taking any prescription medications at this time. Pt has a new prescription for oxycodone PRN, however she states this was for after her procedure and she has not started it yet.  No oral antibiotics in last 30 days.  Pt's preferred pharmacy: [facility] [place] [facility].

## 2021-10-08 NOTE — ED PROVIDER NOTES
ED Provider Note    Chief Complaint:   Shortness of breath, tachycardia, concern for aspiration    HPI:  Rosa M Easton is a very pleasant 26-year-old woman who presents to the emergency department today as a transfer from an orthopedic surgical center.  Prior to her arrival, I discussed her clinical course at the outpatient surgical center with referring physician, Dr. Rdz, anesthesiologist.  She presented today to undergo a mass removal from the hand.  She reports no prior history of asthma, is a former smoker.  Inhaled anesthesia was initially administered utilizing an LMA, however a small amount of clear fluid was noted in the LMA which was removed, oropharynx was suctioned, and out of concern for the possibility of aspiration endotracheal intubation was initiated.  During recovery she was found to be hypoxic, with oxygen saturations in the low 90s that responded well to nasal cannula.  Additionally she was found to be tachypneic.  On further discussion, she states that she was diagnosed with allergies as a teenager, about 9 or 10 years ago, and prescribed an inhaler for those symptoms.  She has no prior diagnosis of asthma nor reactive airway disease.    She was observed at the surgical center, and continued to have tachycardia, and mild tachypnea, she was treated with 2 nebulized albuterol treatments with some improvement in respiratory rate and oxygenation.  She remained with tachycardia, which may be an albuterol side effect.  She continues to have low oxygen levels on room air, oxygen saturations are in the mid 90s on 2 l nasal cannula.  Given abnormal oxygen saturation, and continued tachycardia, she was sent from the outpatient surgical center to our emergency department for further evaluation and treatment.  On arrival, she states she is feeling improved.  Triage oxygen was documented 88%, on my initial assessment oxygen is 95% on 2 l nasal cannula.  She is not having any active chest pain, she has  no abdominal pain.  She reports no recent travel, no recent immobilization.  Aside from her surgical procedure today, she has not had any recent procedures or hospitalizations.  She is not currently on any estrogen-containing medications, and has no history of DVT nor pulmonary embolism.  Symptoms did seem somewhat alleviated with administration of albuterol.  On review of systems she is not having any severe headaches, she has had no recent fevers.  She did have a negative COVID-19 test prior to the procedure, this was a Covid PCR that was performed 4 days ago.  She is not vaccinated for COVID-19.    Review of Systems:  See HPI for pertinent positives and negatives. All other systems negative.    Past Medical History:   has a past medical history of Allergy, unspecified not elsewhere classified and Depression.    Social History:  Social History     Tobacco Use   • Smoking status: Former Smoker     Packs/day: 0.25     Types: Cigarettes     Quit date: 2018     Years since quittin.8   • Smokeless tobacco: Never Used   Vaping Use   • Vaping Use: Never used   Substance and Sexual Activity   • Alcohol use: Yes     Comment: rarely   • Drug use: No     Comment: denies   • Sexual activity: Not on file       Surgical History:   has a past surgical history that includes meniscus repair (2014); acl reconstruction w/ hamstring autograft (Right, ); and other orthopedic surgery.    Current Medications:  Home Medications     Reviewed by Sydnie Hunter R.N. (Registered Nurse) on 10/08/21 at 1036  Med List Status: Not Addressed   Medication Last Dose Status   DiphenhydrAMINE HCl (BENADRYL ALLERGY PO)  Active   fexofenadine (ALLEGRA) 60 MG TABS  Active   oxyCODONE immediate-release (ROXICODONE) 5 MG Tab  Active   Pseudoephedrine-APAP-DM (DAYQUIL PO)  Active   sodium chloride (OCEAN) 0.65 % Solution  Active                Allergies:  Allergies   Allergen Reactions   • Latex Rash and Itching     Moderate itching and rash   "      Physical Exam:  Vital Signs: /57   Pulse (!) 107   Temp 36.7 °C (98 °F) (Temporal)   Resp 18   Ht 1.676 m (5' 6\")   Wt 113 kg (250 lb)   LMP 10/04/2021 (Within Days)   SpO2 96%   BMI 40.35 kg/m²   Constitutional: Alert, calm, no respiratory distress  HENT: Normocephalic, mask in place  Eyes: Pupils equal and reactive, normal conjunctiva  Neck: Supple, normal range of motion, no stridor  Cardiovascular: Extremities are warm and well perfused, no murmur appreciated, tachycardic rate  Pulmonary: No respiratory distress, normal work of breathing, no accessory muscule usage, she does have a few intermittent scattered expiratory wheezes, no severe wheezing throughout all lung fields, no crackles, no coarse breath sounds  Abdomen: Soft, non-distended, non-tender to palpation  Skin: Warm, dry, no rashes or lesions  Musculoskeletal: Normal range of motion in all extremities, no swelling or deformity noted  Neurologic: Alert, oriented, normal speech, normal motor function  Psychiatric: Normal and appropriate mood and affect    Medical records reviewed for continuity of care.  Records reviewed from [facility] [place].  Anesthesiology post procedure evaluation reviewed.  Following the procedure, patient was noted to have elevated respiratory rate in the 30s and oxygen saturation of 95% on facemask.  Heart rate in the 110s.  She received 2 nebulized albuterol treatments.  Due to continued tachycardia, and tachypnea, she was transferred to the emergency department for further evaluation given limitations of diagnostic and treatment options at outpatient surgery center.    EKG: Rate 24, sinus tachycardia, no ST elevation or depression, diffuse T wave flattening, no pathologic T wave inversions    Labs:  Labs Reviewed   CBC WITH DIFFERENTIAL - Abnormal; Notable for the following components:       Result Value    WBC 16.8 (*)     MCHC 35.8 (*)     Neutrophils-Polys 89.50 (*)     Lymphocytes 7.70 (*)  "    Neutrophils (Absolute) 15.07 (*)     All other components within normal limits   COMP METABOLIC PANEL - Abnormal; Notable for the following components:    Potassium 3.5 (*)     Co2 19 (*)     Glucose 147 (*)     ALT(SGPT) 57 (*)     All other components within normal limits   D-DIMER - Abnormal; Notable for the following components:    D-Dimer Screen 1.42 (*)     All other components within normal limits   HCG QUAL SERUM   COV-2, FLU A/B, AND RSV BY PCR    Narrative:     Have you been in close contact with a person who is suspected  or known to be positive for COVID-19 within the last 30 days  (e.g. last seen that person < 30 days ago)->Unknown   ESTIMATED GFR       Radiology:  CT-CTA CHEST PULMONARY ARTERY W/ RECONS   Final Result            1.  Multifocal Covid pneumonitis.      2.  No CT evidence of pulmonary emboli.      DX-CHEST-PORTABLE (1 VIEW)   Final Result      1.  Hypoinflation with bibasilar atelectasis.   2.  No lumbar pneumonia or pneumothorax.           ED Medications Administered:  Medications   iohexol (OMNIPAQUE) 350 mg/mL (35 mL Intravenous Given 10/8/21 1315)       Differential diagnosis:  Aspiration pneumonitis, aspiration event triggering reactive airway disease, COVID-19, other pneumonia, pulmonary embolism    MDM:  Ms. Easton presents to the emergency department today for evaluation of a possible aspiration event that occurred in outpatient surgical center earlier today.  On arrival to the emergency department she remains tachycardic, and requiring supplemental oxygen to maintain saturations in the high 90s.  She is not having any retractions, no severe wheezing, no evidence of respiratory fatigue or impending respiratory failure.  She does not have any risk factors for pulmonary embolism, however given hypoxia and tachycardia pulmonary embolism remains in my differential.    On laboratory evaluation White blood count is 16.8, she is afebrile with no history of recent fevers, suspect  this may be reactive rather than due to infectious etiology.  CMP with no significant abnormalities, potassium is just below normal reference range, noncontributory to her presentation today.  hCG is negative.  COVID-19 testing is negative.  D-dimer is positive at 1.42.    Chest x-ray demonstrates hypoinflation with bibasilar atelectasis, no lobar pneumonia or pneumothorax identified.    On my reassessment, she remains with persistent tachycardia and heart rate of 114.  She is not having any worsening breathing, but continues to require 2 L of supplemental oxygen to maintain saturations in the mid 90s.  Due to tachycardia, hypoxia, and elevated D-dimer, CT of the chest was ordered for further evaluation.  This demonstrates multifocal pneumonitis, consistent with Covid pneumonitis.  No CT evidence of pulmonary embolism appreciated.    On further discussion with Ms. Easton, she states that 3 or 4 days ago she was having symptoms of Covid including cough, and chills.  Her partner was also experiencing symptoms of Covid.  They were both tested and tested negative at that time.  Her symptoms did not seem to be worsening at the time that she underwent her procedure.      Plan is time is for admission to [facility] family medicine service for continued respiratory support.  Elevated white blood count and multifocal pneumonia, I did give a dose of Unasyn in the emergency department.     Personal protective equipment including N95 surgical respirator, goggles, and gloves were used during this encounter.       Disposition:  Admit to [facility] family medicine in stable condition    Final Impression:  1. Hypoxia    2. Multifocal pneumonia    3. Aspiration into airway, initial encounter        Electronically signed by: Camryn Cho MD, 10/8/2021 5:06 PM

## 2021-10-08 NOTE — H&P
History & Physical Note    Date of Admission: 10/8/2021  Admission Status: Inpatient  [facility] Team: [facility] Team  Attending: Garo English M.D.   Senior Resident: Dr. Ike Wiley  Intern: Dr. Stacy Lezama  Contact Number: [phone]    Chief Complaint: Shortness of breath, tachycardia.    History of Present Illness (HPI):   Rosa M is a pleasant 26 y.o. female, obese (BMI 40.35), Covid unvaccinated, former smoker (0.25 packs/day for 2 years, quit 2018) with PMH of bipolar type II disorder (diagnosed at 20 years of age-not on any treatment) who presented from the orthopedic surgical center (where she went for removal of a mass on the middle finger of her left hand-ganglion cyst VS lipoma) and was intubated for concern of aspiration after inhaled anesthesia was administered utilizing an LMA s/p extubation.  During recovery she was found to be hypoxic with oxygen saturation in the low 90s that responded to nasal cannula and was tachypneic; she was given 2 nebulized albuterol treatments with some improvement.  Patient denies any fever, chills or palpitations.  She describes chest pain and cough associated with deep breathing.  The cough is productive of thick sputum.  She also describes shortness of breath when laying down.  She denies any recent history of air travel, oral contraceptive pill use or of prolonged bedrest.    CTA chest was negative for pulmonary emboli and showed multifocal pneumonitis concerning for Covid pneumonia.  EKG showed sinus tachycardia with a rate of 108 with some T wave abnormalities.    In the ED, patient was saturating around 94% on 1 L of oxygen.  She received 1 dose of Unasyn-3 g in the ED.  The initial Covid PCR was negative, repeating Covid PCR because of high degree of suspicion.      Review of Systems: Review of Systems   Constitutional: Negative for chills, fever and malaise/fatigue.   HENT: Negative for hearing loss.    Eyes: Negative for pain and discharge.   Respiratory:  Positive for cough, sputum production and shortness of breath. Negative for wheezing.    Cardiovascular: Positive for chest pain and orthopnea. Negative for palpitations and PND.   Gastrointestinal: Negative for abdominal pain, blood in stool, constipation, diarrhea and melena.   Genitourinary: Negative for dysuria, frequency and urgency.   Musculoskeletal: Negative for myalgias.   Skin: Negative for itching and rash.   Neurological: Negative for dizziness.   Psychiatric/Behavioral: The patient is not nervous/anxious.          Past Medical History:   Past Medical History was reviewed with patient.   has a past medical history of Allergy, unspecified not elsewhere classified and Depression. She also has no past medical history of ASTHMA or Diabetes.   History of bipolar type II diagnosed when patient was 20 years old-she was prescribed medications that she only took 6 months and has been noncompliant since.    Past Surgical History: Past Surgical History was reviewed with patient.   has a past surgical history that includes meniscus repair (5/2014); acl reconstruction w/ hamstring autograft (Right, 2014); and other orthopedic surgery.    Medications: Medications have been reviewed with patient.  Prior to Admission Medications   Prescriptions Last Dose Informant Patient Reported? Taking?   acetaminophen (TYLENOL) 500 MG Tab 10/5/2021 at PRN Patient Yes Yes   Sig: Take 500 mg by mouth every 6 hours as needed for Mild Pain (Headache).   calcium carbonate (TUMS) 500 MG Chew Tab 10/7/2021 at 1900 Patient Yes Yes   Sig: Chew 500 mg after meals as needed (Heartburn).   loratadine (CLARITIN) 10 MG Tab 10/3/2021 at PRN Patient Yes Yes   Sig: Take 10 mg by mouth 1 time a day as needed (Seasonal Allergies).   oxyCODONE immediate-release (ROXICODONE) 5 MG Tab New RX at NOT STARTED Patient No No   Sig: Take 1 Tablet by mouth every four hours as needed for Severe Pain for up to 5 days.   oxymetazoline (AFRIN) 0.05 % Solution  10/7/2021 at 2300 Patient Yes Yes   Sig: Administer 1 Spray into affected nostril(S) 2 times a day as needed for Congestion.      Facility-Administered Medications: None        Allergies: Allergies have been reviewed with patient.  Allergies   Allergen Reactions   • Latex Rash and Itching     Moderate itching and rash        Family History:   family history is not on file.     Social History:   Tobacco: Quit in 2018, 0.25 packs/day for 2 years.  Alcohol: Positive, 5-6 drinks once a week  Recreational drugs (illegal and prescription): Negative  Employment: Works with [employer]  Activity Level: Moderate  Living situation: Lives with boyfriend, recently shifted to "[employer].  Recent travel: Shifted to "[employer] about a month ago, no international travel, no air travel  Primary Care Provider: reviewed Pcp Pt States None  Other (stressors, spirituality, exposures):    Physical Exam: Vitals:  Temp:  [36.6 °C (97.8 °F)-37 °C (98.6 °F)] 36.7 °C (98 °F)  Pulse:  [] 104  Resp:  [14-33] 18  BP: (113-155)/(56-91) 118/59  SpO2:  [88 %-97 %] 92 %    Physical Exam  Constitutional:       General: She is not in acute distress.     Appearance: She is obese. She is not ill-appearing, toxic-appearing or diaphoretic.   HENT:      Head: Normocephalic and atraumatic.      Right Ear: External ear normal.      Left Ear: External ear normal.      Nose: Nose normal. No congestion.      Mouth/Throat:      Mouth: Mucous membranes are moist.      Pharynx: No oropharyngeal exudate.   Eyes:      General: No scleral icterus.        Right eye: No discharge.         Left eye: No discharge.   Cardiovascular:      Rate and Rhythm: Regular rhythm. Tachycardia present.      Pulses: Normal pulses.   Pulmonary:      Effort: Pulmonary effort is normal.      Breath sounds: Rales present.   Abdominal:      General: Bowel sounds are normal.      Palpations: Abdomen is soft.      Tenderness: There is no abdominal tenderness. There is no guarding or rebound.    Musculoskeletal:      Right lower leg: Edema present.      Left lower leg: Edema present.   Skin:     General: Skin is warm.      Capillary Refill: Capillary refill takes less than 2 seconds.      Coloration: Skin is not jaundiced.      Findings: No bruising.   Neurological:      Mental Status: She is alert and oriented to person, place, and time.   Psychiatric:         Mood and Affect: Mood normal.         Labs:   Recent Labs     10/08/21  1122   WBC 16.8*   RBC 4.39   HEMOGLOBIN 13.8   HEMATOCRIT 38.5   MCV 87.7   MCH 31.4   RDW 40.8   PLATELETCT 193   MPV 10.6   NEUTSPOLYS 89.50*   LYMPHOCYTES 7.70*   MONOCYTES 1.70   EOSINOPHILS 0.20   BASOPHILS 0.40     Recent Labs     10/08/21  1122   SODIUM 140   POTASSIUM 3.5*   CHLORIDE 106   CO2 19*   GLUCOSE 147*   BUN 15     D-dimers 1.42  Covid PCR negative  Beta hCG negative      EKG: Per my read, EKG showed a sinus tachycardia with a rate of around 108 along with some T wave abnormalities.    Imaging:     CT-CTA chest pulmonary artery W/recons  Showed multifocal pneumonitis concerning for Covid  No evidence of pulmonary embolism.    Chest x-ray-portable/1 view  Hyper inflation with bibasilar atelectasis.  No lobar pneumonia or pneumothorax.    Previous Data Review: reviewed    Problem Representation:   26-year-old Covid unvaccinated, former smoker(0.25 packs/day for 2 years, quit 2018), obese (BMI 40.35) female who presented from orthopedic surgical center(mass removal from left hand-ganglion cyst versus lipoma) where she was intubated for concern of aspiration after inhaled anesthesia utilizing an LMA was administered, s/p extubation found to have CAP vs aspiration pneumonia vs COVID PNA (PCR negative, repeating PCR) on Unasyn and azithromycin.    ##Community-acquired pneumonia  ##Aspiration pneumonia  ##Covid pneumonia  Patient presented from the orthopedic surgical center where she went for removal of her left middle finger mass, ganglion cyst VS lipoma.  Inhaled  anesthesia was administered utilizing an LMA for the surgery, she was intubated for the concern of aspiration, s/p extubation.  During recovery she was found to be hypoxic with oxygen saturation in the low 90s which responded to nasal cannula and she was tachypneic.  She was treated with 2 nebulized albuterol treatments and showed some improvement.    Possible community-acquired pneumonia VS aspiration pneumonia, blood cultures are pending.  She received 1 dose of Unasyn in the ED,   Azithromycin 500 daily for 3 days and Unasyn 3 g every 6 hours for a total of 5 days have been scheduled to cover both CAP and aspiration pneumonia.    Patient was feeling sick, with mild fever and cough since the last 4 to 5 days with the initial Covid test negative.  Covid PCR was negative in the ED, however, it is being repeated due to high degree of suspicion and for patient being at high risk of severe Covid if positive due to obesity.  -On guaifenesin 600 twice daily.  -Oxygen as required, currently on 1 L NC.  -Tylenol for pain.  -Zofran and Compazine for nausea/vomiting.  -Currently no indication of IV fluids.    ##Bipolar type II  Patient describes that she was diagnosed with bipolar type II disorder when she was 20 years of age, was prescribed medication, that she continued for about 6 months and then stopped taking.  She currently does not take any medicine for this.    ##Obesity  BMI 40.35.  Follow-up outpatient PCP and with the nutritionist.      DVT prophylaxis: Heparin.

## 2021-10-08 NOTE — ED TRIAGE NOTES
Chief Complaint   Patient presents with   • Aspiration     Pt to ED form [facility] pt has procedure done on L middle finder. Pt was under general anesthesia with LMA pt aspirated on emesis and desaturated to 70s pt placed on 5L NC. RA sat upon arrival 88% placed on 2L NC now 93% AAOx4 NAD

## 2021-10-09 PROBLEM — R91.8 ABNORMAL CT SCAN OF LUNG: Status: ACTIVE | Noted: 2021-10-09

## 2021-10-09 PROBLEM — J96.01 ACUTE RESPIRATORY FAILURE WITH HYPOXIA (HCC): Status: ACTIVE | Noted: 2021-10-09

## 2021-10-09 PROBLEM — E66.01 CLASS 3 SEVERE OBESITY IN ADULT (HCC): Status: ACTIVE | Noted: 2021-10-09

## 2021-10-09 PROBLEM — D72.829 LEUKOCYTOSIS: Status: ACTIVE | Noted: 2021-10-09

## 2021-10-09 LAB
ALBUMIN SERPL BCP-MCNC: 4.3 G/DL (ref 3.2–4.9)
ALBUMIN/GLOB SERPL: 1.4 G/DL
ALP SERPL-CCNC: 61 U/L (ref 30–99)
ALT SERPL-CCNC: 44 U/L (ref 2–50)
ANION GAP SERPL CALC-SCNC: 13 MMOL/L (ref 7–16)
AST SERPL-CCNC: 28 U/L (ref 12–45)
BASOPHILS # BLD AUTO: 0.1 % (ref 0–1.8)
BASOPHILS # BLD: 0.02 K/UL (ref 0–0.12)
BILIRUB SERPL-MCNC: 0.3 MG/DL (ref 0.1–1.5)
BUN SERPL-MCNC: 19 MG/DL (ref 8–22)
CALCIUM SERPL-MCNC: 9.4 MG/DL (ref 8.5–10.5)
CHLORIDE SERPL-SCNC: 105 MMOL/L (ref 96–112)
CO2 SERPL-SCNC: 20 MMOL/L (ref 20–33)
CREAT SERPL-MCNC: 0.68 MG/DL (ref 0.5–1.4)
EOSINOPHIL # BLD AUTO: 0 K/UL (ref 0–0.51)
EOSINOPHIL NFR BLD: 0 % (ref 0–6.9)
ERYTHROCYTE [DISTWIDTH] IN BLOOD BY AUTOMATED COUNT: 41.7 FL (ref 35.9–50)
FLUAV RNA SPEC QL NAA+PROBE: NEGATIVE
FLUBV RNA SPEC QL NAA+PROBE: NEGATIVE
GLOBULIN SER CALC-MCNC: 3 G/DL (ref 1.9–3.5)
GLUCOSE SERPL-MCNC: 133 MG/DL (ref 65–99)
HCT VFR BLD AUTO: 36.7 % (ref 37–47)
HGB BLD-MCNC: 12.9 G/DL (ref 12–16)
IMM GRANULOCYTES # BLD AUTO: 0.1 K/UL (ref 0–0.11)
IMM GRANULOCYTES NFR BLD AUTO: 0.5 % (ref 0–0.9)
LYMPHOCYTES # BLD AUTO: 1.57 K/UL (ref 1–4.8)
LYMPHOCYTES NFR BLD: 8.5 % (ref 22–41)
MCH RBC QN AUTO: 31.5 PG (ref 27–33)
MCHC RBC AUTO-ENTMCNC: 35.1 G/DL (ref 33.6–35)
MCV RBC AUTO: 89.7 FL (ref 81.4–97.8)
MONOCYTES # BLD AUTO: 0.9 K/UL (ref 0–0.85)
MONOCYTES NFR BLD AUTO: 4.9 % (ref 0–13.4)
NEUTROPHILS # BLD AUTO: 15.87 K/UL (ref 2–7.15)
NEUTROPHILS NFR BLD: 86 % (ref 44–72)
NRBC # BLD AUTO: 0 K/UL
NRBC BLD-RTO: 0 /100 WBC
PLATELET # BLD AUTO: 203 K/UL (ref 164–446)
PMV BLD AUTO: 11.2 FL (ref 9–12.9)
POTASSIUM SERPL-SCNC: 4.2 MMOL/L (ref 3.6–5.5)
PROCALCITONIN SERPL-MCNC: <0.05 NG/ML
PROT SERPL-MCNC: 7.3 G/DL (ref 6–8.2)
RBC # BLD AUTO: 4.09 M/UL (ref 4.2–5.4)
RSV RNA SPEC QL NAA+PROBE: NEGATIVE
SARS-COV-2 RNA RESP QL NAA+PROBE: NOTDETECTED
SODIUM SERPL-SCNC: 138 MMOL/L (ref 135–145)
SPECIMEN SOURCE: NORMAL
WBC # BLD AUTO: 18.5 K/UL (ref 4.8–10.8)

## 2021-10-09 PROCEDURE — 700105 HCHG RX REV CODE 258: Performed by: INTERNAL MEDICINE

## 2021-10-09 PROCEDURE — 700111 HCHG RX REV CODE 636 W/ 250 OVERRIDE (IP): Performed by: INTERNAL MEDICINE

## 2021-10-09 PROCEDURE — C9803 HOPD COVID-19 SPEC COLLECT: HCPCS | Performed by: STUDENT IN AN ORGANIZED HEALTH CARE EDUCATION/TRAINING PROGRAM

## 2021-10-09 PROCEDURE — A9270 NON-COVERED ITEM OR SERVICE: HCPCS | Performed by: INTERNAL MEDICINE

## 2021-10-09 PROCEDURE — 85025 COMPLETE CBC W/AUTO DIFF WBC: CPT

## 2021-10-09 PROCEDURE — 36415 COLL VENOUS BLD VENIPUNCTURE: CPT

## 2021-10-09 PROCEDURE — 700102 HCHG RX REV CODE 250 W/ 637 OVERRIDE(OP): Performed by: INTERNAL MEDICINE

## 2021-10-09 PROCEDURE — 770001 HCHG ROOM/CARE - MED/SURG/GYN PRIV*

## 2021-10-09 PROCEDURE — 94640 AIRWAY INHALATION TREATMENT: CPT

## 2021-10-09 PROCEDURE — 80053 COMPREHEN METABOLIC PANEL: CPT

## 2021-10-09 PROCEDURE — 700102 HCHG RX REV CODE 250 W/ 637 OVERRIDE(OP): Performed by: STUDENT IN AN ORGANIZED HEALTH CARE EDUCATION/TRAINING PROGRAM

## 2021-10-09 PROCEDURE — 99233 SBSQ HOSP IP/OBS HIGH 50: CPT | Performed by: STUDENT IN AN ORGANIZED HEALTH CARE EDUCATION/TRAINING PROGRAM

## 2021-10-09 PROCEDURE — 0241U HCHG SARS-COV-2 COVID-19 NFCT DS RESP RNA 4 TRGT MIC: CPT

## 2021-10-09 PROCEDURE — A9270 NON-COVERED ITEM OR SERVICE: HCPCS | Performed by: STUDENT IN AN ORGANIZED HEALTH CARE EDUCATION/TRAINING PROGRAM

## 2021-10-09 RX ORDER — OXYCODONE HYDROCHLORIDE 5 MG/1
5 TABLET ORAL EVERY 4 HOURS PRN
Status: DISCONTINUED | OUTPATIENT
Start: 2021-10-09 | End: 2021-10-11 | Stop reason: HOSPADM

## 2021-10-09 RX ORDER — ALBUTEROL SULFATE 90 UG/1
2 AEROSOL, METERED RESPIRATORY (INHALATION)
Status: DISCONTINUED | OUTPATIENT
Start: 2021-10-09 | End: 2021-10-10 | Stop reason: ALTCHOICE

## 2021-10-09 RX ORDER — DEXAMETHASONE 6 MG/1
6 TABLET ORAL DAILY
Status: DISCONTINUED | OUTPATIENT
Start: 2021-10-09 | End: 2021-10-11 | Stop reason: HOSPADM

## 2021-10-09 RX ADMIN — ALBUTEROL SULFATE 2 PUFF: 90 AEROSOL, METERED RESPIRATORY (INHALATION) at 09:53

## 2021-10-09 RX ADMIN — ALBUTEROL SULFATE 2 PUFF: 90 AEROSOL, METERED RESPIRATORY (INHALATION) at 18:11

## 2021-10-09 RX ADMIN — OXYCODONE 5 MG: 5 TABLET ORAL at 11:03

## 2021-10-09 RX ADMIN — OXYCODONE 5 MG: 5 TABLET ORAL at 18:10

## 2021-10-09 RX ADMIN — SODIUM CHLORIDE 3 G: 900 INJECTION INTRAVENOUS at 03:11

## 2021-10-09 RX ADMIN — ALBUTEROL SULFATE 2 PUFF: 90 AEROSOL, METERED RESPIRATORY (INHALATION) at 22:55

## 2021-10-09 RX ADMIN — HEPARIN SODIUM 5000 UNITS: 5000 INJECTION, SOLUTION INTRAVENOUS; SUBCUTANEOUS at 13:55

## 2021-10-09 RX ADMIN — GUAIFENESIN 600 MG: 600 TABLET, EXTENDED RELEASE ORAL at 06:00

## 2021-10-09 RX ADMIN — ACETAMINOPHEN 1000 MG: 500 TABLET ORAL at 08:42

## 2021-10-09 RX ADMIN — DEXAMETHASONE 6 MG: 6 TABLET ORAL at 08:39

## 2021-10-09 RX ADMIN — GUAIFENESIN 600 MG: 600 TABLET, EXTENDED RELEASE ORAL at 18:10

## 2021-10-09 ASSESSMENT — ENCOUNTER SYMPTOMS
WHEEZING: 1
COUGH: 1
SHORTNESS OF BREATH: 1
CONSTITUTIONAL NEGATIVE: 1

## 2021-10-09 NOTE — ASSESSMENT & PLAN NOTE
After intubation for outpatient surgical intervention.  Requiring 2 to 4L of oxygen.  CTA chest negative for PE but with findings concerning for Covid pneumonitis.    Differential this time includes aspiration pneumonitis, COVID-19 pneumonitis, viral pneumonia.  COVID-19 negative x2.    Wean oxygen requirement as she tolerates.

## 2021-10-09 NOTE — ASSESSMENT & PLAN NOTE
Likely due to pneumonitis-aspiration vs COVID-19.  Closely monitor CBC for now.  No overt concern for infection at this time.   Procalcitonin negative antibiotics discontinued

## 2021-10-09 NOTE — ASSESSMENT & PLAN NOTE
CTA chest:  IMPRESSION:  1.  Multifocal Covid pneumonitis.    Differential this time includes aspiration pneumonitis, COVID-19 pneumonitis, for pneumonia.  COVID-19 negative x2.    Likely due to aspiration pneumonitis.   How Severe Is Your Eczema?: moderate Is This A New Presentation, Or A Follow-Up?: Rash

## 2021-10-09 NOTE — PROGRESS NOTES
4 Eyes Skin Assessment Completed by Tova , RN and Sean, RN.    Patient refused skin assessment with two nurses, only able to complete what RN was visually able to see     Head WDL  Ears Unable to see   Nose WDL  Mouth WDL  Neck WDL  Breast/Chest Unable to see  Shoulder Blades Unable to see  Spine Unable to see  (R) Arm/Elbow/Hand WDL  (L) Arm/Elbow/Hand dressing to middle finger of left hand - surgery done 10/8/21  Abdomen Unable to see  Groin Unable to see  Scrotum/Coccyx/Buttocks Unable to see  (R) Leg Unable to see  (L) Leg Unable to see  (R) Heel/Foot/Toe Unable to see  (L) Heel/Foot/Toe Unable to see          Devices In Places Pulse Ox and Nasal Cannula      Interventions In Place Gray Ear Foams    Possible Skin Injury No    Pictures Uploaded Into Epic N/A  Wound Consult Placed N/A  RN Wound Prevention Protocol Ordered No

## 2021-10-09 NOTE — PROGRESS NOTES
[facility] Medicine Daily Progress Note    Date of Service  10/9/2021    Chief Complaint  Rosa M Easton is a 26 y.o. female admitted 10/8/2021 with hypoxic yesica failure.    Interval Problem Update  Patient seen and examined at bedside this morning.  No acute events overnight.     Admitted yesterday from outpatient surgical center for concerns for hypoxic respiratory failure.   CT chest negative for PE but with findings concerning for multifocal pneumonitis.  COVID-19 negative x1 in the ED. Repeat testing ordered this morning.  Patient with bilateral wheezes noted on examination. On 2 L oxygen. Will start on steroids for concern for COVID-19 pneumonia. Procalcitonin negative, antibiotics discontinued.  We will keep patient in the hospital for at least 24 hours and closely monitor her oxygen requirement, will also follow-up on repeat COVID-19 testing. If negative x2, her symptoms are likely due to aspiration pneumonitis after surgical procedure yesterday.    I have personally seen and examined the patient at bedside. I discussed the plan of care with patient, family and bedside RN.    Consultants/Specialty  none    Code Status  Full Code    Disposition  Patient is not medically cleared.   Anticipate discharge to to home with close outpatient follow-up.  I have placed the appropriate orders for post-discharge needs.    Review of Systems  Review of Systems   Constitutional: Negative.    Respiratory: Positive for cough, shortness of breath and wheezing.    Musculoskeletal: Positive for joint pain.   All other systems reviewed and are negative.       Physical Exam  Temp:  [36 °C (96.8 °F)-36.3 °C (97.3 °F)] 36 °C (96.8 °F)  Pulse:  [] 82  Resp:  [17-18] 18  BP: (107-147)/(56-79) 121/65  SpO2:  [88 %-97 %] 96 %    Physical Exam  Constitutional:       General: She is not in acute distress.     Appearance: She is obese.   HENT:      Head: Normocephalic and atraumatic.      Nose: Nose normal.   Eyes:       Conjunctiva/sclera: Conjunctivae normal.   Cardiovascular:      Rate and Rhythm: Normal rate.      Pulses: Normal pulses.   Pulmonary:      Effort: Respiratory distress present.      Breath sounds: Wheezing present.   Abdominal:      General: Bowel sounds are normal.      Palpations: Abdomen is soft.   Musculoskeletal:         General: Tenderness and signs of injury present.      Cervical back: Neck supple.      Comments: Left third digit-wrapped in bandage, clean, dry and intact.   Skin:     General: Skin is warm.   Neurological:      General: No focal deficit present.      Mental Status: She is alert and oriented to person, place, and time.   Psychiatric:         Mood and Affect: Mood normal.         Behavior: Behavior normal.         Fluids    Intake/Output Summary (Last 24 hours) at 10/9/2021 1037  Last data filed at 10/8/2021 2120  Gross per 24 hour   Intake 1200 ml   Output --   Net 1200 ml       Laboratory  Recent Labs     10/08/21  1122 10/09/21  0251   WBC 16.8* 18.5*   RBC 4.39 4.09*   HEMOGLOBIN 13.8 12.9   HEMATOCRIT 38.5 36.7*   MCV 87.7 89.7   MCH 31.4 31.5   MCHC 35.8* 35.1*   RDW 40.8 41.7   PLATELETCT 193 203   MPV 10.6 11.2     Recent Labs     10/08/21  1122 10/09/21  0251   SODIUM 140 138   POTASSIUM 3.5* 4.2   CHLORIDE 106 105   CO2 19* 20   GLUCOSE 147* 133*   BUN 15 19   CREATININE 0.72 0.68   CALCIUM 8.9 9.4                   Imaging  CT-CTA CHEST PULMONARY ARTERY W/ RECONS   Final Result            1.  Multifocal Covid pneumonitis.      2.  No CT evidence of pulmonary emboli.      DX-CHEST-PORTABLE (1 VIEW)   Final Result      1.  Hypoinflation with bibasilar atelectasis.   2.  No lumbar pneumonia or pneumothorax.           Assessment/Plan  * Acute respiratory failure with hypoxia (HCC)  Assessment & Plan  After intubation for outpatient surgical intervention.  Requiring 2 to 4L of oxygen.  CTA chest negative for PE but with findings concerning for Covid pneumonitis.    Differential this time  includes aspiration pneumonitis, COVID-19 pneumonitis, viral pneumonia.    COVID-19 negative x1. Repeat testing ordered.  We will start on steroids regardless to cover COVID-19.  Wean oxygen requirement as she tolerates.    Abnormal CT scan of lung  Assessment & Plan  CTA chest:  IMPRESSION:  1.  Multifocal Covid pneumonitis.    Differential this time includes aspiration pneumonitis, COVID-19 pneumonitis, for pneumonia.    COVID-19 negative x1. Repeat testing ordered.  We will start on steroids regardless to cover COVID-19.  Wean oxygen requirement as she tolerates.    Leukocytosis  Assessment & Plan  Likely due to pneumonitis-aspiration vs COVID-19.  Closely monitor CBC for now.  No overt concern for infection at this time.   Procalcitonin negative antibiotics discontinued    Class 3 severe obesity in adult (HCC)  Assessment & Plan  Patient has been counseled on diet and lifestyle modifications  Recommended outpatient weight management program and bariatric surgery evaluation  Pt educated on the increase of morbidity and mortality associated with excess weight including DM, Heart Disease, HTN, stroke, and sleep apnea.  Pt advised weight loss of 5% through reduced calorie, low carb diet and 150 mins of exercise a week    Mass of finger of left hand- (present on admission)  Assessment & Plan  Lipoma of left third digit.  S/p removal by orthopedic surgery.  Outpatient follow-up.  Pain control.       VTE prophylaxis: heparin ppx    I have performed a physical exam and reviewed and updated ROS and Plan today (10/9/2021). In review of yesterday's note (10/8/2021), there are no changes except as documented above.    Patient is has a high medical complexity and is at high risk for complication, morbidity, and mortality.    Case discussed with patient, nurse staff and during multidisciplinary rounds.

## 2021-10-09 NOTE — DIETARY
NUTRITION SERVICES: BMI - Pt with BMI >40 (=Body mass index is 43.06 kg/m².), Class III obesity. Noted dietary consult in place for tube feeding, however pt also on regular diet. Reviewed H&P and consult to nutrition under plan for obesity, however weight loss counseling not appropriate in acute care setting.     RECOMMEND - If appropriate at DC please refer to outpatient nutrition services for weight management.     RD available prn - and will monitor per dept policy

## 2021-10-09 NOTE — ASSESSMENT & PLAN NOTE
Lipoma of left third digit.  S/p removal by orthopedic surgery.  Outpatient follow-up.  Pain control.

## 2021-10-09 NOTE — PROGRESS NOTES
HAND & UPPER EXTREMITY  PROGRESS NOTE    ID:  Rosa M Easton is a pleasant 26 y.o. female POD#1 s/p left middle finger mass excision. Possible aspiration intraop, who was transferred to [facility] post operatively after she had ongoing O2 requirements in the PACU.      INTERVAL EVENTS:  - O2 stable on 2L NC  - She endorses pain in her chest, and a cough.   - She underwent a CTA yesterday that was negative for PE, but did have evidence of diffuse pneumonitis (possible COVID pneumonitis). Covid testing in ED negative (as was her pre op testing). Some question of a cold 4 days ago. She denies any F/C or cough, she just felt under the weather. Her boyfriend had similar symptoms.   - Middle finger remains numb since surgery, as does the radial half of the index finger  - Denies the dressing feeling tight or constrictive    PHYSICAL EXAM:   General: No acute distress  Respiratory: Unlabored on 2L NC    Left upper extremity: Skin is clean and dry. Loose coban dressing in place to middle finger with extension onto the palm and wrist. Middle finger tip is exposed, well perfused, 2 second capillary refill. Diminished sensation to light touch, as well as to the index radial aspect.  Fires EPL, FPL, and TERRY (index).  Palpable radial pulse, digits warm and well-perfused with 2 second capillary refill.    IMAGING:   CTA CHEST: 10/8/21:   1.  Multifocal Covid pneumonitis.     2.  No CT evidence of pulmonary emboli.          LABS:   Recent Labs     10/08/21  1122 10/09/21  0251   WBC 16.8* 18.5*   RBC 4.39 4.09*   HEMOGLOBIN 13.8 12.9   HEMATOCRIT 38.5 36.7*   MCV 87.7 89.7   MCH 31.4 31.5   RDW 40.8 41.7   PLATELETCT 193 203   MPV 10.6 11.2   NEUTSPOLYS 89.50* 86.00*   LYMPHOCYTES 7.70* 8.50*   MONOCYTES 1.70 4.90   EOSINOPHILS 0.20 0.00   BASOPHILS 0.40 0.10     Recent Labs     10/08/21  1122 10/09/21  0251   SODIUM 140 138   POTASSIUM 3.5* 4.2   CHLORIDE 106 105   CO2 19* 20   GLUCOSE 147* 133*   BUN 15 19        ASSESSMENT/PLAN: Rosa M Easton is a 26 y.o. female now POD#1 s/p left middle finger mass excision, who sustained possible intraop aspiration, admitted for acute post operative hypoxia with evidence on exam and imaging of pneumonitis (covid vs aspiration). Numbness in the left middle finger and partial index finger likely due to bupivicaine injection at the end of the procedure. Will monitor closely, and if persists tomorrow will take her dressing down.       1. Maintain dressing to left hand  2. Appreciate medicine assistance with her care  3. Please do not hesitate to contact me with any questions or concerns      Manda Gonsales M.D.

## 2021-10-10 LAB
ANION GAP SERPL CALC-SCNC: 12 MMOL/L (ref 7–16)
BUN SERPL-MCNC: 15 MG/DL (ref 8–22)
CALCIUM SERPL-MCNC: 9 MG/DL (ref 8.5–10.5)
CHLORIDE SERPL-SCNC: 105 MMOL/L (ref 96–112)
CO2 SERPL-SCNC: 22 MMOL/L (ref 20–33)
CREAT SERPL-MCNC: 0.63 MG/DL (ref 0.5–1.4)
ERYTHROCYTE [DISTWIDTH] IN BLOOD BY AUTOMATED COUNT: 42.5 FL (ref 35.9–50)
GLUCOSE SERPL-MCNC: 112 MG/DL (ref 65–99)
HCT VFR BLD AUTO: 36.8 % (ref 37–47)
HGB BLD-MCNC: 12.2 G/DL (ref 12–16)
MCH RBC QN AUTO: 30 PG (ref 27–33)
MCHC RBC AUTO-ENTMCNC: 33.2 G/DL (ref 33.6–35)
MCV RBC AUTO: 90.4 FL (ref 81.4–97.8)
PLATELET # BLD AUTO: 187 K/UL (ref 164–446)
PMV BLD AUTO: 10.9 FL (ref 9–12.9)
POTASSIUM SERPL-SCNC: 4.1 MMOL/L (ref 3.6–5.5)
RBC # BLD AUTO: 4.07 M/UL (ref 4.2–5.4)
SODIUM SERPL-SCNC: 139 MMOL/L (ref 135–145)
WBC # BLD AUTO: 12.4 K/UL (ref 4.8–10.8)

## 2021-10-10 PROCEDURE — 80048 BASIC METABOLIC PNL TOTAL CA: CPT

## 2021-10-10 PROCEDURE — 94640 AIRWAY INHALATION TREATMENT: CPT

## 2021-10-10 PROCEDURE — 700102 HCHG RX REV CODE 250 W/ 637 OVERRIDE(OP): Performed by: INTERNAL MEDICINE

## 2021-10-10 PROCEDURE — 94760 N-INVAS EAR/PLS OXIMETRY 1: CPT

## 2021-10-10 PROCEDURE — 85027 COMPLETE CBC AUTOMATED: CPT

## 2021-10-10 PROCEDURE — 36415 COLL VENOUS BLD VENIPUNCTURE: CPT

## 2021-10-10 PROCEDURE — A9270 NON-COVERED ITEM OR SERVICE: HCPCS | Performed by: INTERNAL MEDICINE

## 2021-10-10 PROCEDURE — A9270 NON-COVERED ITEM OR SERVICE: HCPCS | Performed by: STUDENT IN AN ORGANIZED HEALTH CARE EDUCATION/TRAINING PROGRAM

## 2021-10-10 PROCEDURE — 770001 HCHG ROOM/CARE - MED/SURG/GYN PRIV*

## 2021-10-10 PROCEDURE — 700102 HCHG RX REV CODE 250 W/ 637 OVERRIDE(OP): Performed by: STUDENT IN AN ORGANIZED HEALTH CARE EDUCATION/TRAINING PROGRAM

## 2021-10-10 PROCEDURE — 99233 SBSQ HOSP IP/OBS HIGH 50: CPT | Performed by: STUDENT IN AN ORGANIZED HEALTH CARE EDUCATION/TRAINING PROGRAM

## 2021-10-10 PROCEDURE — 700111 HCHG RX REV CODE 636 W/ 250 OVERRIDE (IP): Performed by: INTERNAL MEDICINE

## 2021-10-10 RX ADMIN — DOCUSATE SODIUM 50 MG AND SENNOSIDES 8.6 MG 2 TABLET: 8.6; 5 TABLET, FILM COATED ORAL at 17:49

## 2021-10-10 RX ADMIN — HEPARIN SODIUM 5000 UNITS: 5000 INJECTION, SOLUTION INTRAVENOUS; SUBCUTANEOUS at 13:50

## 2021-10-10 RX ADMIN — OXYCODONE 5 MG: 5 TABLET ORAL at 04:42

## 2021-10-10 RX ADMIN — ALBUTEROL SULFATE 2 PUFF: 90 AEROSOL, METERED RESPIRATORY (INHALATION) at 07:08

## 2021-10-10 RX ADMIN — ACETAMINOPHEN 1000 MG: 500 TABLET ORAL at 02:41

## 2021-10-10 RX ADMIN — OXYCODONE 5 MG: 5 TABLET ORAL at 19:35

## 2021-10-10 RX ADMIN — OXYCODONE 5 MG: 5 TABLET ORAL at 11:33

## 2021-10-10 RX ADMIN — GUAIFENESIN 600 MG: 600 TABLET, EXTENDED RELEASE ORAL at 04:38

## 2021-10-10 RX ADMIN — HEPARIN SODIUM 5000 UNITS: 5000 INJECTION, SOLUTION INTRAVENOUS; SUBCUTANEOUS at 04:38

## 2021-10-10 RX ADMIN — GUAIFENESIN 600 MG: 600 TABLET, EXTENDED RELEASE ORAL at 17:49

## 2021-10-10 RX ADMIN — DEXAMETHASONE 6 MG: 6 TABLET ORAL at 04:38

## 2021-10-10 ASSESSMENT — ENCOUNTER SYMPTOMS
CONSTITUTIONAL NEGATIVE: 1
COUGH: 1
WHEEZING: 1
SHORTNESS OF BREATH: 1

## 2021-10-10 ASSESSMENT — PAIN DESCRIPTION - PAIN TYPE
TYPE: ACUTE PAIN

## 2021-10-10 ASSESSMENT — PAIN SCALES - WONG BAKER: WONGBAKER_NUMERICALRESPONSE: HURTS JUST A LITTLE BIT

## 2021-10-10 NOTE — PROGRESS NOTES
[facility] Medicine Daily Progress Note    Date of Service  10/10/2021    Chief Complaint  RosaM Easton is a 26 y.o. female admitted 10/8/2021 with hypoxic yesica failure.    Interval Problem Update  Patient seen and examined at bedside this morning.  No acute events overnight.     Oxygen requirement down to 1L this morning. Repeat COVID-19 testing negative x2.  Her symptoms are likely due to an aspiration event during recent outpatient surgical procedure.  Home oxygen evaluation ordered this morning. She may need oxygen at discharge home.    I have personally seen and examined the patient at bedside. I discussed the plan of care with patient, family and bedside RN.    Consultants/Specialty  none    Code Status  Full Code    Disposition  Patient is not medically cleared.   Anticipate discharge to to home with close outpatient follow-up.  I have placed the appropriate orders for post-discharge needs.    Review of Systems  Review of Systems   Constitutional: Negative.    Respiratory: Positive for cough, shortness of breath and wheezing.    Musculoskeletal: Positive for joint pain.   All other systems reviewed and are negative.       Physical Exam  Temp:  [36.1 °C (97 °F)-36.5 °C (97.7 °F)] 36.2 °C (97.2 °F)  Pulse:  [63-89] 79  Resp:  [14-19] 19  BP: (110-123)/(54-70) 123/67  SpO2:  [83 %-95 %] 91 %    Physical Exam  Constitutional:       General: She is not in acute distress.     Appearance: She is obese.   HENT:      Head: Normocephalic and atraumatic.      Nose: Nose normal.   Eyes:      Conjunctiva/sclera: Conjunctivae normal.   Cardiovascular:      Rate and Rhythm: Normal rate.      Pulses: Normal pulses.   Pulmonary:      Effort: Respiratory distress present.      Breath sounds: Wheezing present.   Abdominal:      General: Bowel sounds are normal.      Palpations: Abdomen is soft.   Musculoskeletal:         General: Tenderness and signs of injury present.      Cervical back: Neck supple.      Comments: Left  third digit-wrapped in bandage, clean, dry and intact.   Skin:     General: Skin is warm.   Neurological:      General: No focal deficit present.      Mental Status: She is alert and oriented to person, place, and time.   Psychiatric:         Mood and Affect: Mood normal.         Behavior: Behavior normal.         Fluids  No intake or output data in the 24 hours ending 10/10/21 1003    Laboratory  Recent Labs     10/08/21  1122 10/09/21  0251 10/10/21  0144   WBC 16.8* 18.5* 12.4*   RBC 4.39 4.09* 4.07*   HEMOGLOBIN 13.8 12.9 12.2   HEMATOCRIT 38.5 36.7* 36.8*   MCV 87.7 89.7 90.4   MCH 31.4 31.5 30.0   MCHC 35.8* 35.1* 33.2*   RDW 40.8 41.7 42.5   PLATELETCT 193 203 187   MPV 10.6 11.2 10.9     Recent Labs     10/08/21  1122 10/09/21  0251 10/10/21  0144   SODIUM 140 138 139   POTASSIUM 3.5* 4.2 4.1   CHLORIDE 106 105 105   CO2 19* 20 22   GLUCOSE 147* 133* 112*   BUN 15 19 15   CREATININE 0.72 0.68 0.63   CALCIUM 8.9 9.4 9.0                   Imaging  CT-CTA CHEST PULMONARY ARTERY W/ RECONS   Final Result            1.  Multifocal Covid pneumonitis.      2.  No CT evidence of pulmonary emboli.      DX-CHEST-PORTABLE (1 VIEW)   Final Result      1.  Hypoinflation with bibasilar atelectasis.   2.  No lumbar pneumonia or pneumothorax.           Assessment/Plan  * Acute respiratory failure with hypoxia (HCC)  Assessment & Plan  After intubation for outpatient surgical intervention.  Requiring 2 to 4L of oxygen.  CTA chest negative for PE but with findings concerning for Covid pneumonitis.    Differential this time includes aspiration pneumonitis, COVID-19 pneumonitis, viral pneumonia.  COVID-19 negative x2.    Wean oxygen requirement as she tolerates.    Abnormal CT scan of lung  Assessment & Plan  CTA chest:  IMPRESSION:  1.  Multifocal Covid pneumonitis.    Differential this time includes aspiration pneumonitis, COVID-19 pneumonitis, for pneumonia.  COVID-19 negative x2.    Likely due to aspiration  pneumonitis.    Leukocytosis  Assessment & Plan  Likely due to pneumonitis-aspiration vs COVID-19.  Closely monitor CBC for now.  No overt concern for infection at this time.   Procalcitonin negative antibiotics discontinued    Class 3 severe obesity in adult (HCC)  Assessment & Plan  Patient has been counseled on diet and lifestyle modifications  Recommended outpatient weight management program and bariatric surgery evaluation  Pt educated on the increase of morbidity and mortality associated with excess weight including DM, Heart Disease, HTN, stroke, and sleep apnea.  Pt advised weight loss of 5% through reduced calorie, low carb diet and 150 mins of exercise a week    Mass of finger of left hand- (present on admission)  Assessment & Plan  Lipoma of left third digit.  S/p removal by orthopedic surgery.  Outpatient follow-up.  Pain control.       VTE prophylaxis: heparin ppx    I have performed a physical exam and reviewed and updated ROS and Plan today (10/10/2021). In review of yesterday's note (10/9/2021), there are no changes except as documented above.    Patient is has a high medical complexity and is at high risk for complication, morbidity, and mortality.    Case discussed with patient, nurse staff and during multidisciplinary rounds.

## 2021-10-10 NOTE — DISCHARGE PLANNING
Received Choice form at 3584  Agency/Facility Name: [facility]  Referral sent per Choice form @ 0420

## 2021-10-10 NOTE — FACE TO FACE
"Face to Face Note  -  Durable Medical Equipment    Julius Jones D.O. - NPI: [id number]  I certify that this patient is under my care and that they had a durable medical equipment(DME)face to face encounter by myself that meets the physician DME face-to-face encounter requirements with this patient on:    Date of encounter:   Patient:                    MRN:                       YOB: 2019  Rosa M [place]  [id number]  1995     The encounter with the patient was in whole, or in part, for the following medical condition, which is the primary reason for durable medical equipment:  Other - Aspiration pneumonia    I certify that, based on my findings, the following durable medical equipment is medically necessary:  Oxygen.    HOME O2 Saturation Measurements:(Values must be present for Home Oxygen orders)  Room air sat at rest: 92  Room air sat with amb: 86  With liters of O2: 1, O2 sat at rest with O2: 93  With Liters of O2: 2, O2 sat with amb with O2 : 90  Is the patient mobile?: Yes    My Clinical findings support the need for the above equipment due to:  Hypoxia    Supporting Symptoms: The patient requires supplemental oxygen, as the following interventions have been tried with limited or no improvement: \"Ambulation with oximetry    If patient feels more short of breath, they can go up to 6 liters per minute and contact healthcare provider.  "

## 2021-10-10 NOTE — DISCHARGE PLANNING
Sent referral to [facility]    Agency/Facility Name: [facility]  Spoke To: Agustin  Outcome: Agustin stated that he would have Deisi f/u with this DPA as soon as she returns to her desk    [facility] Name[facility]  Spoke To: Agustin  Outcome: Agustin stated that Deisi has finished the order and delivery will be made with 1 hour

## 2021-10-10 NOTE — DISCHARGE PLANNING
Care Transition Team Discharge Planning    Anticipates discharge disposition:  • Home with home oxygen set up    Action:  • This RN CM spoke with Leroy, Pt's SO who agreed with Pt's home oxygen set up at home and his choices are [facility] and [facility] . Ezequielmahendranicholsa was faxed to Reynaldo OWEN.  • Per Leroy, he can provide transport to home once Pt is medically clear.    14:20 Requested Reynaldo OWEN to send referral to [facility] as there is no response yet from [facility] yet. Informed Charge Nurse Elena.    15:48 This RN CM informed RN Ariadna that Pt is still pending DME acceptance for oxygen with [facility].and that BRAYDEN Jacobs is working on it.     Requestef Reynaldo OWEN to update RN Ariadna if Pt is accepted by [facility] and if oxygen can be delivered today via voalte. Informed RN Ariadna.    Barriers to Discharge:  • Pending Pt's DME acceptance and delivery of oxygen tank to bedside. Per Leroy grullon for concentrator to be delivered to home.    Plan:  • CM to continue to assist Pt with discharge as needed

## 2021-10-11 VITALS
HEIGHT: 66 IN | DIASTOLIC BLOOD PRESSURE: 74 MMHG | BODY MASS INDEX: 42.87 KG/M2 | WEIGHT: 266.76 LBS | SYSTOLIC BLOOD PRESSURE: 127 MMHG | HEART RATE: 62 BPM | RESPIRATION RATE: 18 BRPM | TEMPERATURE: 96.7 F | OXYGEN SATURATION: 95 %

## 2021-10-11 PROCEDURE — 700102 HCHG RX REV CODE 250 W/ 637 OVERRIDE(OP): Performed by: STUDENT IN AN ORGANIZED HEALTH CARE EDUCATION/TRAINING PROGRAM

## 2021-10-11 PROCEDURE — 700102 HCHG RX REV CODE 250 W/ 637 OVERRIDE(OP): Performed by: INTERNAL MEDICINE

## 2021-10-11 PROCEDURE — 99239 HOSP IP/OBS DSCHRG MGMT >30: CPT | Performed by: STUDENT IN AN ORGANIZED HEALTH CARE EDUCATION/TRAINING PROGRAM

## 2021-10-11 PROCEDURE — A9270 NON-COVERED ITEM OR SERVICE: HCPCS | Performed by: INTERNAL MEDICINE

## 2021-10-11 PROCEDURE — A9270 NON-COVERED ITEM OR SERVICE: HCPCS | Performed by: STUDENT IN AN ORGANIZED HEALTH CARE EDUCATION/TRAINING PROGRAM

## 2021-10-11 RX ADMIN — OXYCODONE 5 MG: 5 TABLET ORAL at 08:04

## 2021-10-11 RX ADMIN — GUAIFENESIN 600 MG: 600 TABLET, EXTENDED RELEASE ORAL at 04:32

## 2021-10-11 RX ADMIN — OXYCODONE 5 MG: 5 TABLET ORAL at 00:48

## 2021-10-11 RX ADMIN — DEXAMETHASONE 6 MG: 6 TABLET ORAL at 04:32

## 2021-10-11 ASSESSMENT — PAIN DESCRIPTION - PAIN TYPE
TYPE: ACUTE PAIN
TYPE: ACUTE PAIN

## 2021-10-11 NOTE — PROGRESS NOTES
Discharge instructions explained to pt, including importance of follow-up appointments. Instructed on opioid use and using non-opioid alternatives for pain management. Instructed to report to ED for worsening shortness of breath. Instructed on s/s of infection to report to MD including fever, swelling/drainage at surgical site. Pt verbalized understanding of instructions.

## 2021-10-11 NOTE — DISCHARGE PLANNING
Anticipated Discharge Disposition:   Home with home oxygen    Action:   Discussed discharge planning needs. Per Hospitalist, pt is medically cleared for discharge home today with home oxygen. Per bedside RN, oxygen tanks delivered at bedside, oxygen concentrator at home.     Barriers to Discharge:   None.    Plan:   Discharge home with home oxygen.  Hospital Care Management will continue to follow and assist with discharge planning needs.

## 2021-10-11 NOTE — DISCHARGE INSTRUCTIONS
Discharge Instructions    Discharged to home by car with relative. Discharged via wheelchair, hospital escort: Yes.  Special equipment needed: Oxygen    Be sure to schedule a follow-up appointment with your primary care doctor or any specialists as instructed.     Discharge Plan:   Influenza Vaccine Indication: Patient Refuses    I understand that a diet low in cholesterol, fat, and sodium is recommended for good health. Unless I have been given specific instructions below for another diet, I accept this instruction as my diet prescription.   Other diet: n/a    Special Instructions: None    · Is patient discharged on Warfarin / Coumadin?   No     Depression / Suicide Risk    As you are discharged from this [facility] facility, it is important to learn how to keep safe from harming yourself.    Recognize the warning signs:  · Abrupt changes in personality, positive or negative- including increase in energy   · Giving away possessions  · Change in eating patterns- significant weight changes-  positive or negative  · Change in sleeping patterns- unable to sleep or sleeping all the time   · Unwillingness or inability to communicate  · Depression  · Unusual sadness, discouragement and loneliness  · Talk of wanting to die  · Neglect of personal appearance   · Rebelliousness- reckless behavior  · Withdrawal from people/activities they love  · Confusion- inability to concentrate     If you or a loved one observes any of these behaviors or has concerns about self-harm, here's what you can do:  · Talk about it- your feelings and reasons for harming yourself  · Remove any means that you might use to hurt yourself (examples: pills, rope, extension cords, firearm)  · Get professional help from the community (Mental Health, Substance Abuse, psychological counseling)  · Do not be alone:Call your Safe Contact- someone whom you trust who will be there for you.  · Call your local CRISIS HOTLINE 566-8511 or 845-437-4430  · Call  your local Children's Mobile Crisis Response Team [place] (945) 568-3803 or www.Key Ring  · Call the toll free National Suicide Prevention Hotlines   · National Suicide Prevention Lifeline 462-067-IQVC (5053)  · invi Hope Line Network 800-SUICIDE (550-6743)        Pneumonitis    Pneumonitis is inflammation of the lungs. Infection or exposure to certain substances or allergens can cause this condition. Allergens are substances that you are allergic to.  What are the causes?  This condition may be caused by:  · An infection from bacteria or a virus (pneumonia).  · Exposure to certain substances in the workplace. This includes working on farms and in certain industries. Some substances that can cause this condition include asbestos, silica, inhaled acids, or inhaled chlorine gas.  · Repeated exposure to bird feathers, bird feces, or other allergens.  · Medicines such as chemotherapy drugs, certain antibiotic medicines, and some heart medicines.  · Radiation therapy.  · Exposure to mold. A hot tub, sauna, or home humidifier can have mold growing in it, even if it looks clean. You can breathe in the mold through water vapor.  · Breathing in (aspirating) stomach contents, food, or liquids into the lungs.  What are the signs or symptoms?  Symptoms of this condition include:  · Shortness of breath or trouble breathing. This is the most common symptom.  · Cough.  · Fever.  · Decreased energy.  · Decreased appetite.  How is this diagnosed?  This condition may be diagnosed based on:  · Your medical history.  · Physical exam.  · Blood tests.  · Other tests, including:  ? Pulmonary function test (PFT). This measures how well your lungs work.  ? Chest X-ray.  ? CT scan of the lungs.  ? Bronchoscopy. In this procedure, your health care provider looks at your airways through an instrument called a bronchoscope.  ? Lung biopsy. In this procedure, your health care provider takes a small piece of tissue from your  lungs to examine it.  How is this treated?  Treatment depends on the cause of the condition. If the cause is exposure to a substance, avoiding further exposure to that substance will help reduce your symptoms. Possible medical treatments for pneumonitis include:  · Corticosteroid medicine to help decrease inflammation.  · Antibiotic medicine to help fight an infection caused by bacteria.  · Bronchodilators or inhalers to help relax the muscles and make breathing easier.  · Oxygen therapy, if you are having trouble breathing.  Follow these instructions at home:  · Take or use over-the-counter and prescription medicines only as told by your health care provider. This includes any inhaler use.  · Avoid exposure to any substance that caused your pneumonitis. If you need to work with substances that can cause pneumonitis, wear a mask to protect your lungs.  · If you were prescribed an antibiotic, take it as told by your health care provider. Do not stop taking the antibiotic even if you start to feel better.  · If you were prescribed an inhaler, keep it with you at all times.  · Do not use any products that contain nicotine or tobacco, such as cigarettes and e-cigarettes. If you need help quitting, ask your health care provider.  · Keep all follow-up visits as told by your health care provider. This is important.  Contact a health care provider if:  · You have a fever.  · Your symptoms get worse.  Get help right away if:  · You have new or worse shortness of breath.  · You develop a blue color (cyanosis) under your fingernails.  Summary  · Pneumonitis is inflammation of the lungs. This condition can be caused by infection or exposure to certain substances or allergens.  · The most common symptom of this condition is shortness of breath or trouble breathing.  · Treatment depends on the cause of your condition.  This information is not intended to replace advice given to you by your health care provider. Make sure you  discuss any questions you have with your health care provider.  Document Released: 06/07/2011 Document Revised: 11/30/2018 Document Reviewed: 11/09/2017  [employer] Patient Education © 2020 [employer]      Home Oxygen Use, Adult  When a medical condition keeps you from getting enough oxygen, your health care provider may instruct you to take extra oxygen at home. Your health care provider will let you know:  · When to take oxygen.  · For how long to take oxygen.  · How quickly oxygen should be delivered (flow rate), in liters per minute (LPM or L/M).  Home oxygen can be given through:  · A mask.  · A nasal cannula. This is a device or tube that goes in the nostrils.  · A transtracheal catheter. This is a small, flexible tube placed in the trachea.  · A tracheostomy. This is a surgically made opening in the trachea.  These devices are connected with tubing to an oxygen source, such as:  · A tank. Tanks hold oxygen in gas form. They must be replaced when the oxygen is used up.  · A liquid oxygen device. This holds oxygen in liquid form. It must be replaced when the oxygen is used up.  · An oxygen concentrator machine. This filters oxygen in the room. It uses electricity, so you must have a backup cylinder of oxygen in case the power goes out.  Supplies needed:  To use oxygen, you will need:  · A mask, nasal cannula, transtracheal catheter, or tracheostomy.  · An oxygen tank, a liquid oxygen device, or an oxygen concentrator.  · The tape that your health care provider recommends (optional).  If you use a transtracheal catheter and your prescribed flow rate is 1 LPM or greater, you will also need a humidifier.  Risks and complications  · Fire. This can happen if the oxygen is exposed to a heat source, flame, or spark.  · Injury to skin. This can happen if liquid oxygen touches your skin.  · Organ damage. This can happen if you get too little oxygen.  How to use oxygen  Your health care provider or a representative from  your medical device company will show you how to use your oxygen device. Follow her or his instructions. The instructions may look something like this:  1. Wash your hands.  2. If you use an oxygen concentrator, make sure it is plugged in.  3. Place one end of the tube into the port on the tank, device, or machine.  4. Place the mask over your nose and mouth. Or, place the nasal cannula and secure it with tape if instructed. If you use a tracheostomy or transtracheal catheter, connect it to the oxygen source as directed.  5. Make sure the liter-flow setting on the machine is at the level prescribed by your health care provider.  6. Turn on the machine or adjust the knob on the tank or device to the correct liter-flow setting.  7. When you are done, turn off and unplug the machine, or turn the knob to OFF.  How to clean and care for the oxygen supplies  Nasal cannula  · Clean it with a warm, wet cloth daily or as needed.  · Wash it with a liquid soap once a week.  · Rinse it thoroughly once or twice a week.  · Replace it every 2-4 weeks.  · If you have an infection, such as a cold or pneumonia, change the cannula when you get better.  Mask  · Replace it every 2-4 weeks.  · If you have an infection, such as a cold or pneumonia, change the mask when you get better.  Humidifier bottle  · Wash the bottle between each refill:  ? Wash it with soap and warm water.  ? Rinse it thoroughly.  ? Disinfect it and its top.  ? Air-dry it.  · Make sure it is dry before you refill it.  Oxygen concentrator  · Clean the air filter at least twice a week according to directions from your home medical equipment and service company.  · Wipe down the cabinet every day. To do this:  ? Unplug the unit.  ? Wipe down the cabinet with a damp cloth.  ? Dry the cabinet.  Other equipment  · Change any extra tubing every 1-3 months.  · Follow instructions from your health care provider about taking care of any other equipment.  Safety tips  Fire  "safety tips    · Keep your oxygen and oxygen supplies at least 5 ft away from sources of heat, flames, and blackmon at all times.  · Do not allow smoking near your oxygen. Put up \"no smoking\" signs in your home. Avoid smoking areas when in public.  · Do not use materials that can burn (are flammable) while you use oxygen.  · When you go to a restaurant with portable oxygen, ask to be seated in the nonsmoking section.  · Keep a fire extinguisher close by. Let your fire department know that you have oxygen in your home.  · Test your home smoke detectors regularly.  Traveling  · Secure your oxygen tank in the vehicle so that it does not move around. Follow instructions from your medical device company about how to safely secure your tank.  · Make sure you have enough oxygen for the amount of time you will be away from home.  · If you are planning air travel, contact the airline to find out if they allow the use of an approved portable oxygen concentrator. You may also need documents from your health care provider and medical device company before you travel.  General safety tips  · If you use an oxygen cylinder, make sure it is in a stand or secured to an object that will not move (fixed object).  · If you use liquid oxygen, make sure its container is kept upright.  · If you use an oxygen concentrator:  ? Tell your electric company. Make sure you are given priority service in the event that your power goes out.  ? Avoid using extension cords, if possible.  Follow these instructions at home:  · Use oxygen only as told by your health care provider.  · Do not use alcohol or other drugs that make you relax (sedating drugs) unless instructed. They can slow down your breathing rate and make it hard to get in enough oxygen.  · Know how and when to order a refill of oxygen.  · Always keep a spare tank of oxygen. Plan ahead for holidays when you may not be able to get a prescription filled.  · Use water-based lubricants on your " lips or nostrils. Do not use oil-based products like petroleum jelly.  · To prevent skin irritation on your cheeks or behind your ears, tuck some gauze under the tubing.  Contact a health care provider if:  · You get headaches often.  · You have shortness of breath.  · You have a lasting cough.  · You have anxiety.  · You are sleepy all the time.  · You develop an illness that affects your breathing.  · You cannot exercise at your regular level.  · You are restless.  · You have difficult or irregular breathing, and it is getting worse.  · You have a fever.  · You have persistent redness under your nose.  Get help right away if:  · You are confused.  · You have blue lips or fingernails.  · You are struggling to breathe.  Summary  · Your health care provider or a representative from your medical device company will show you how to use your oxygen device. Follow her or his instructions.  · If you use an oxygen concentrator, make sure it is plugged in.  · Make sure the liter-flow setting on the machine is at the level prescribed by your health care provider.  · Keep your oxygen and oxygen supplies at least 5 ft away from sources of heat, flames, and blackmon at all times.  This information is not intended to replace advice given to you by your health care provider. Make sure you discuss any questions you have with your health care provider.  Document Released: 03/09/2005 Document Revised: 06/06/2019 Document Reviewed: 07/11/2017  [email or web address] Patient Education © 2020 [employer]

## 2021-10-11 NOTE — DISCHARGE SUMMARY
Discharge Summary    CHIEF COMPLAINT ON ADMISSION  Chief Complaint   Patient presents with   • Aspiration       Reason for Admission  TRANS from [facility]     Admission Date  10/8/2021    CODE STATUS  Full Code    HPI & HOSPITAL COURSE  This is a 26 y.o. female admitted on 10/8/2021 from outpatient surgical center for concern for aspiration pneumonia/pneumonitis.  Patient apparently aspirated with anesthesia during her surgical evaluation. She was noted to be hypoxic down to 88% and was sent to the ED for further evaluation. In the emergency room, CT chest with findings concerning for bilateral opacities/pneumonitis. There was a high suspicion for COVID-19 pneumonitis due to CT findings, as a result she was placed on steroids for a few days and was tested for COVID-19. Testing came back negative x2. She was continued on steroids and supplemental oxygen. Her symptoms was likely due to an aspiration event.  Prior to discharge, she was evaluated and approved for home oxygen therapy. She was discharged home on 2L of oxygen and advised to follow-up outpatient with her PCP and orthopedic surgeon.    Therefore, she is discharged in fair and stable condition to home with close outpatient follow-up.    The patient met 2-midnight criteria for an inpatient stay at the time of discharge.    Discharge Date  10/11/2021    FOLLOW UP ITEMS POST DISCHARGE  None    DISCHARGE DIAGNOSES  Principal Problem:    Acute respiratory failure with hypoxia (HCC) POA: Unknown  Active Problems:    Abnormal CT scan of lung POA: Unknown    Mass of finger of left hand POA: Yes      Overview: Added automatically from request for surgery [id number]    Class 3 severe obesity in adult (HCC) POA: Unknown    Leukocytosis POA: Unknown  Resolved Problems:    * No resolved hospital problems. *      FOLLOW UP  Future Appointments   Date Time Provider Department Center   10/26/2021  1:15 PM Manda Gonsales M.D. [facility]     PCP    In 1  week        MEDICATIONS ON DISCHARGE     Medication List      CONTINUE taking these medications      Instructions   acetaminophen 500 MG Tabs  Commonly known as: TYLENOL   Take 500 mg by mouth every 6 hours as needed for Mild Pain (Headache).  Dose: 500 mg     calcium carbonate 500 MG Chew  Commonly known as: TUMS   Chew 500 mg after meals as needed (Heartburn).  Dose: 500 mg     loratadine 10 MG Tabs  Commonly known as: CLARITIN   Take 10 mg by mouth 1 time a day as needed (Seasonal Allergies).  Dose: 10 mg     oxyCODONE immediate-release 5 MG Tabs  Commonly known as: ROXICODONE   Take 1 Tablet by mouth every four hours as needed for Severe Pain for up to 5 days.  Dose: 5 mg     oxymetazoline 0.05 % Soln  Commonly known as: AFRIN   Administer 1 Spray into affected nostril(S) 2 times a day as needed for Congestion.  Dose: 1 Spray            Allergies  Allergies   Allergen Reactions   • Latex Rash and Itching     Moderate itching and rash        DIET  Orders Placed This Encounter   Procedures   • Diet Order Diet: Regular     Standing Status:   Standing     Number of Occurrences:   1     Order Specific Question:   Diet:     Answer:   Regular [1]       ACTIVITY  As tolerated.  Weight bearing as tolerated    CONSULTATIONS  Orthopedic surgery    PROCEDURES  Left third digit lipoma removal    LABORATORY  Lab Results   Component Value Date    SODIUM 139 10/10/2021    POTASSIUM 4.1 10/10/2021    CHLORIDE 105 10/10/2021    CO2 22 10/10/2021    GLUCOSE 112 (H) 10/10/2021    BUN 15 10/10/2021    CREATININE 0.63 10/10/2021        Lab Results   Component Value Date    WBC 12.4 (H) 10/10/2021    HEMOGLOBIN 12.2 10/10/2021    HEMATOCRIT 36.8 (L) 10/10/2021    PLATELETCT 187 10/10/2021        Total time of the discharge process exceeds 33 minutes.

## 2021-10-12 NOTE — PROGRESS NOTES
HAND & UPPER EXTREMITY  PROGRESS NOTE    ID:  Rosa M Easton is a pleasant 26 y.o. female POD#3 s/p left middle finger mass excision. Possible aspiration intraop, who was transferred to [facility] post operatively after she had ongoing O2 requirements in the PACU.      INTERVAL EVENTS:  - O2 stable on 1L NC  - Dyspnea and chest discomfort improved  - Mild pain in her middle finger  - Denies any numbness or tingling in her fingers    PHYSICAL EXAM:   General: No acute distress, sitting up in bed today, boyfriend at bedside  Respiratory: Unlabored on 1L NC    Left upper extremity: Skin is clean and dry. Loose coban dressing in place to middle finger with extension onto the palm and wrist. The coban has rolled in the 1st webspace, so the hand and wrist portion of the dressing was removed. The finger dressing was secured to the palm with paper tape. Middle finger tip is exposed, well perfused, 2 second capillary refill. Sensation intact to light touch throughout, and symmetric. Fires EPL, FPL, and TERRY (index).  Palpable radial pulse, digits warm and well-perfused with 2 second capillary refill.    IMAGING:   No new        LABS:   Recent Labs     10/09/21  0251 10/10/21  0144   WBC 18.5* 12.4*   RBC 4.09* 4.07*   HEMOGLOBIN 12.9 12.2   HEMATOCRIT 36.7* 36.8*   MCV 89.7 90.4   MCH 31.5 30.0   RDW 41.7 42.5   PLATELETCT 203 187   MPV 11.2 10.9   NEUTSPOLYS 86.00*  --    LYMPHOCYTES 8.50*  --    MONOCYTES 4.90  --    EOSINOPHILS 0.00  --    BASOPHILS 0.10  --      Recent Labs     10/09/21  0251 10/10/21  0144   SODIUM 138 139   POTASSIUM 4.2 4.1   CHLORIDE 105 105   CO2 20 22   GLUCOSE 133* 112*   BUN 19 15       ASSESSMENT/PLAN: Rosa M Easton is a 26 y.o. female now POD#3 s/p left middle finger mass excision, who sustained possible intraop aspiration, admitted for acute post operative hypoxia with evidence on exam and imaging of aspiration pneumonitis.     Overall, she continues to improve. She is down to 1L NC.      1. Maintain dressing to left hand  2. Appreciate medicine assistance with her care  3. Please do not hesitate to contact me with any questions or concerns      Manda Gonsales M.D.

## 2021-10-13 LAB
BACTERIA BLD CULT: NORMAL
BACTERIA BLD CULT: NORMAL
SIGNIFICANT IND 70042: NORMAL
SIGNIFICANT IND 70042: NORMAL
SITE SITE: NORMAL
SITE SITE: NORMAL
SOURCE SOURCE: NORMAL
SOURCE SOURCE: NORMAL

## 2022-01-16 ENCOUNTER — HOSPITAL ENCOUNTER (OUTPATIENT)
Facility: MEDICAL CENTER | Age: 27
End: 2022-01-16
Attending: NURSE PRACTITIONER
Payer: COMMERCIAL

## 2022-01-16 ENCOUNTER — OFFICE VISIT (OUTPATIENT)
Dept: URGENT CARE | Facility: CLINIC | Age: 27
End: 2022-01-16
Payer: COMMERCIAL

## 2022-01-16 VITALS
SYSTOLIC BLOOD PRESSURE: 122 MMHG | OXYGEN SATURATION: 95 % | DIASTOLIC BLOOD PRESSURE: 72 MMHG | HEIGHT: 66 IN | HEART RATE: 99 BPM | BODY MASS INDEX: 44.1 KG/M2 | RESPIRATION RATE: 24 BRPM | WEIGHT: 274.4 LBS | TEMPERATURE: 97.5 F

## 2022-01-16 DIAGNOSIS — J98.8 RTI (RESPIRATORY TRACT INFECTION): ICD-10-CM

## 2022-01-16 DIAGNOSIS — R05.9 COUGH: ICD-10-CM

## 2022-01-16 PROCEDURE — 99213 OFFICE O/P EST LOW 20 MIN: CPT | Performed by: NURSE PRACTITIONER

## 2022-01-16 PROCEDURE — U0003 INFECTIOUS AGENT DETECTION BY NUCLEIC ACID (DNA OR RNA); SEVERE ACUTE RESPIRATORY SYNDROME CORONAVIRUS 2 (SARS-COV-2) (CORONAVIRUS DISEASE [COVID-19]), AMPLIFIED PROBE TECHNIQUE, MAKING USE OF HIGH THROUGHPUT TECHNOLOGIES AS DESCRIBED BY CMS-2020-01-R: HCPCS

## 2022-01-16 ASSESSMENT — ENCOUNTER SYMPTOMS
COUGH: 1
HEADACHES: 1
NAUSEA: 1
CHILLS: 1
FEVER: 1
MYALGIAS: 0
RHINORRHEA: 1
SHORTNESS OF BREATH: 0
SORE THROAT: 0
VOMITING: 0
EYE PAIN: 0
DIZZINESS: 0

## 2022-01-16 ASSESSMENT — FIBROSIS 4 INDEX: FIB4 SCORE: 0.56

## 2022-01-17 NOTE — PROGRESS NOTES
Subjective:   Rosa M Easton is a 27 y.o. female who presents for Coronavirus Screening (nausea, vomiting, fever, bodyaches, headache, cough x 2 days )      URI   This is a new problem. The current episode started yesterday. The problem has been unchanged. The maximum temperature recorded prior to her arrival was 100.4 - 100.9 F. The fever has been present for less than 1 day. Associated symptoms include congestion, coughing, headaches, nausea and rhinorrhea. Pertinent negatives include no chest pain, ear pain, rash, sore throat or vomiting. She has tried acetaminophen for the symptoms. The treatment provided no relief.       Review of Systems   Constitutional: Positive for chills, fever and malaise/fatigue.   HENT: Positive for congestion and rhinorrhea. Negative for ear pain and sore throat.    Eyes: Negative for pain.   Respiratory: Positive for cough. Negative for shortness of breath.    Cardiovascular: Negative for chest pain.   Gastrointestinal: Positive for nausea. Negative for vomiting.   Genitourinary: Negative for hematuria.   Musculoskeletal: Negative for myalgias.   Skin: Negative for rash.   Neurological: Positive for headaches. Negative for dizziness.       Medications:    • TRAZODONE HCL PO  • ZOLOFT PO    Allergies: Latex    Problem List: Rosa M Easton does not have any pertinent problems on file.    Surgical History:  Past Surgical History:   Procedure Laterality Date   • CYST EXCISION Left 10/8/2021    Procedure: LEFT MIDDLE FINGER MASS EXCISION;  Surgeon: Manda Gonsales M.D.;  Location: [facility];  Service: Orthopedics   • MENISCUS REPAIR  5/2014    Right   • RECONSTRUCTION, KNEE, ACL, USING HAMSTRING AUTOGRAFT Right 2014   • OTHER ORTHOPEDIC SURGERY         Past Social Hx: Rosa M Easton  reports that she quit smoking about 3 years ago. Her smoking use included cigarettes. She smoked 0.25 packs per day. She has never used smokeless tobacco. She reports current  "alcohol use. She reports that she does not use drugs.     Past Family Hx:  Rosa M Easton family history is not on file.     Problem list, medications, and allergies reviewed by myself today in Epic.     Objective:     /72 (BP Location: Left arm, Patient Position: Sitting, BP Cuff Size: Adult)   Pulse 99   Temp 36.4 °C (97.5 °F) (Temporal)   Resp (!) 24   Ht 1.676 m (5' 6\")   Wt 124 kg (274 lb 6.4 oz)   SpO2 95%   BMI 44.29 kg/m²     Physical Exam  Vitals and nursing note reviewed.   Constitutional:       General: She is not in acute distress.     Appearance: She is well-developed.   HENT:      Head: Normocephalic and atraumatic.      Right Ear: Tympanic membrane and external ear normal.      Left Ear: Tympanic membrane and external ear normal.      Nose: Nose normal.      Right Sinus: No maxillary sinus tenderness or frontal sinus tenderness.      Left Sinus: No maxillary sinus tenderness or frontal sinus tenderness.      Mouth/Throat:      Mouth: Mucous membranes are moist.      Pharynx: Uvula midline. No posterior oropharyngeal erythema.      Tonsils: No tonsillar exudate or tonsillar abscesses.   Eyes:      General:         Right eye: No discharge.         Left eye: No discharge.      Conjunctiva/sclera: Conjunctivae normal.   Cardiovascular:      Rate and Rhythm: Normal rate.   Pulmonary:      Effort: Pulmonary effort is normal. No respiratory distress.      Breath sounds: Normal breath sounds.   Abdominal:      General: There is no distension.   Musculoskeletal:         General: Normal range of motion.   Skin:     General: Skin is warm and dry.   Neurological:      General: No focal deficit present.      Mental Status: She is alert and oriented to person, place, and time. Mental status is at baseline.      Gait: Gait (gait at baseline) normal.   Psychiatric:         Judgment: Judgment normal.         Assessment/Plan:     Diagnosis and associated orders:     1. RTI (respiratory tract infection)  " SARS-CoV-2 PCR (24 hour In-House): Collect NP swab in VTM   2. Cough  SARS-CoV-2 PCR (24 hour In-House): Collect NP swab in VTM      Comments/MDM:     The patient's presenting symptoms and exam findings most likely are due to a viral etiology.     Test for COVID-19 via PCR. Result will be reviewed by myself. We will call/message back for results and appropriate further instructions. Instructed to sign up for [employer] if they have not already. Result will be automatically released to [employer] application for patient review. I will be sending a message with Next Step Instructions to [employer] soon after resulted.   Symptomatic and supportive care:   Plenty of oral hydration and rest   Over the counter cough suppressant as directed.  Tylenol or ibuprofen for pain and fever as directed.   Warm salt water gargles for sore throat, soft foods, cool liquids.   Saline nasal spray and Flonase as a decongestant.   Infection control measures at home. Stay away from people, Hand washing, covering sneeze/cough, disinfect surfaces.   Remain home from work, school, and other populated environments. Work note provided with information of quarantine measures per CDC guidelines.   Overall, the patient is well-appearing. They are not hypoxic, afebrile, and a normal pulmonary exam.      •   •            Differential diagnosis, natural history, supportive care, and indications for immediate follow-up discussed.      Please note that this dictation was created using voice recognition software. I have made a reasonable attempt to correct obvious errors, but I expect that there are errors of grammar and possibly content that I did not discover before finalizing the note.    This note was electronically signed by Warren RENEE.

## 2022-01-18 DIAGNOSIS — J98.8 RTI (RESPIRATORY TRACT INFECTION): ICD-10-CM

## 2022-01-18 DIAGNOSIS — R05.9 COUGH: ICD-10-CM

## 2022-01-18 LAB — COVID ORDER STATUS COVID19: NORMAL

## 2022-01-20 LAB
SARS-COV-2 RNA RESP QL NAA+PROBE: DETECTED
SPECIMEN SOURCE: ABNORMAL

## 2022-06-30 ENCOUNTER — HOSPITAL ENCOUNTER (OUTPATIENT)
Dept: RADIOLOGY | Facility: MEDICAL CENTER | Age: 27
End: 2022-06-30
Attending: COLON & RECTAL SURGERY
Payer: COMMERCIAL

## 2022-06-30 ENCOUNTER — HOSPITAL ENCOUNTER (OUTPATIENT)
Dept: CARDIOLOGY | Facility: MEDICAL CENTER | Age: 27
End: 2022-06-30
Attending: COLON & RECTAL SURGERY
Payer: COMMERCIAL

## 2022-06-30 ENCOUNTER — HOSPITAL ENCOUNTER (OUTPATIENT)
Dept: LAB | Facility: MEDICAL CENTER | Age: 27
End: 2022-06-30
Attending: COLON & RECTAL SURGERY
Payer: COMMERCIAL

## 2022-06-30 DIAGNOSIS — Z81.8 FAMILY HISTORY OF PSYCHIATRIC CONDITION: ICD-10-CM

## 2022-06-30 DIAGNOSIS — E66.01 MORBID OBESITY (HCC): ICD-10-CM

## 2022-06-30 LAB
ALBUMIN SERPL BCP-MCNC: 4.2 G/DL (ref 3.2–4.9)
ALBUMIN/GLOB SERPL: 1.3 G/DL
ALP SERPL-CCNC: 74 U/L (ref 30–99)
ALT SERPL-CCNC: 34 U/L (ref 2–50)
ANION GAP SERPL CALC-SCNC: 13 MMOL/L (ref 7–16)
AST SERPL-CCNC: 24 U/L (ref 12–45)
BASOPHILS # BLD AUTO: 0.7 % (ref 0–1.8)
BASOPHILS # BLD: 0.06 K/UL (ref 0–0.12)
BILIRUB SERPL-MCNC: 0.3 MG/DL (ref 0.1–1.5)
BUN SERPL-MCNC: 10 MG/DL (ref 8–22)
CALCIUM SERPL-MCNC: 9 MG/DL (ref 8.4–10.2)
CHLORIDE SERPL-SCNC: 103 MMOL/L (ref 96–112)
CO2 SERPL-SCNC: 22 MMOL/L (ref 20–33)
CREAT SERPL-MCNC: 0.66 MG/DL (ref 0.5–1.4)
EKG IMPRESSION: NORMAL
EOSINOPHIL # BLD AUTO: 0.16 K/UL (ref 0–0.51)
EOSINOPHIL NFR BLD: 1.7 % (ref 0–6.9)
ERYTHROCYTE [DISTWIDTH] IN BLOOD BY AUTOMATED COUNT: 41.7 FL (ref 35.9–50)
EST. AVERAGE GLUCOSE BLD GHB EST-MCNC: 117 MG/DL
FASTING STATUS PATIENT QL REPORTED: NORMAL
GFR SERPLBLD CREATININE-BSD FMLA CKD-EPI: 124 ML/MIN/1.73 M 2
GLOBULIN SER CALC-MCNC: 3.2 G/DL (ref 1.9–3.5)
GLUCOSE SERPL-MCNC: 93 MG/DL (ref 65–99)
HBA1C MFR BLD: 5.7 % (ref 4–5.6)
HCT VFR BLD AUTO: 40.2 % (ref 37–47)
HGB BLD-MCNC: 14 G/DL (ref 12–16)
IMM GRANULOCYTES # BLD AUTO: 0.03 K/UL (ref 0–0.11)
IMM GRANULOCYTES NFR BLD AUTO: 0.3 % (ref 0–0.9)
LYMPHOCYTES # BLD AUTO: 3.24 K/UL (ref 1–4.8)
LYMPHOCYTES NFR BLD: 35.3 % (ref 22–41)
MCH RBC QN AUTO: 30.6 PG (ref 27–33)
MCHC RBC AUTO-ENTMCNC: 34.8 G/DL (ref 33.6–35)
MCV RBC AUTO: 88 FL (ref 81.4–97.8)
MONOCYTES # BLD AUTO: 0.48 K/UL (ref 0–0.85)
MONOCYTES NFR BLD AUTO: 5.2 % (ref 0–13.4)
NEUTROPHILS # BLD AUTO: 5.21 K/UL (ref 2–7.15)
NEUTROPHILS NFR BLD: 56.8 % (ref 44–72)
NRBC # BLD AUTO: 0 K/UL
NRBC BLD-RTO: 0 /100 WBC
PLATELET # BLD AUTO: 190 K/UL (ref 164–446)
PMV BLD AUTO: 10.7 FL (ref 9–12.9)
POTASSIUM SERPL-SCNC: 3.9 MMOL/L (ref 3.6–5.5)
PROT SERPL-MCNC: 7.4 G/DL (ref 6–8.2)
RBC # BLD AUTO: 4.57 M/UL (ref 4.2–5.4)
SODIUM SERPL-SCNC: 138 MMOL/L (ref 135–145)
TSH SERPL DL<=0.005 MIU/L-ACNC: 2.19 UIU/ML (ref 0.38–5.33)
WBC # BLD AUTO: 9.2 K/UL (ref 4.8–10.8)

## 2022-06-30 PROCEDURE — 85025 COMPLETE CBC W/AUTO DIFF WBC: CPT

## 2022-06-30 PROCEDURE — 93005 ELECTROCARDIOGRAM TRACING: CPT | Performed by: COLON & RECTAL SURGERY

## 2022-06-30 PROCEDURE — 71046 X-RAY EXAM CHEST 2 VIEWS: CPT

## 2022-06-30 PROCEDURE — 80053 COMPREHEN METABOLIC PANEL: CPT

## 2022-06-30 PROCEDURE — 83036 HEMOGLOBIN GLYCOSYLATED A1C: CPT

## 2022-06-30 PROCEDURE — 93010 ELECTROCARDIOGRAM REPORT: CPT | Performed by: INTERNAL MEDICINE

## 2022-06-30 PROCEDURE — 36415 COLL VENOUS BLD VENIPUNCTURE: CPT

## 2022-06-30 PROCEDURE — 84443 ASSAY THYROID STIM HORMONE: CPT

## 2022-06-30 PROCEDURE — 74240 X-RAY XM UPR GI TRC 1CNTRST: CPT

## 2022-06-30 PROCEDURE — 700112 HCHG RX REV CODE 229: Performed by: COLON & RECTAL SURGERY

## 2022-06-30 PROCEDURE — A9270 NON-COVERED ITEM OR SERVICE: HCPCS | Performed by: COLON & RECTAL SURGERY

## 2022-06-30 RX ADMIN — ANTACID/ANTIFLATULENT 1 PACKET: 380; 550; 10; 10 GRANULE, EFFERVESCENT ORAL at 09:40
